# Patient Record
Sex: MALE | Race: WHITE | NOT HISPANIC OR LATINO | Employment: OTHER | URBAN - METROPOLITAN AREA
[De-identification: names, ages, dates, MRNs, and addresses within clinical notes are randomized per-mention and may not be internally consistent; named-entity substitution may affect disease eponyms.]

---

## 2017-09-13 ENCOUNTER — HOSPITAL ENCOUNTER (OUTPATIENT)
Dept: RADIOLOGY | Facility: MEDICAL CENTER | Age: 82
End: 2017-09-13
Attending: INTERNAL MEDICINE
Payer: MEDICARE

## 2017-09-13 ENCOUNTER — OFFICE VISIT (OUTPATIENT)
Dept: MEDICAL GROUP | Facility: PHYSICIAN GROUP | Age: 82
End: 2017-09-13
Payer: MEDICARE

## 2017-09-13 VITALS
SYSTOLIC BLOOD PRESSURE: 118 MMHG | OXYGEN SATURATION: 94 % | DIASTOLIC BLOOD PRESSURE: 70 MMHG | RESPIRATION RATE: 14 BRPM | HEIGHT: 68 IN | HEART RATE: 63 BPM | BODY MASS INDEX: 25.61 KG/M2 | TEMPERATURE: 97.5 F | WEIGHT: 169 LBS

## 2017-09-13 DIAGNOSIS — N40.0 BENIGN NON-NODULAR PROSTATIC HYPERPLASIA WITHOUT LOWER URINARY TRACT SYMPTOMS: ICD-10-CM

## 2017-09-13 DIAGNOSIS — R05.9 COUGH: ICD-10-CM

## 2017-09-13 DIAGNOSIS — G20.A1 PARKINSON DISEASE: ICD-10-CM

## 2017-09-13 DIAGNOSIS — E78.5 DYSLIPIDEMIA: ICD-10-CM

## 2017-09-13 PROCEDURE — 71020 DX-CHEST-2 VIEWS: CPT

## 2017-09-13 PROCEDURE — 99204 OFFICE O/P NEW MOD 45 MIN: CPT | Performed by: INTERNAL MEDICINE

## 2017-09-13 RX ORDER — PRAMIPEXOLE DIHYDROCHLORIDE 0.75 MG/1
TABLET ORAL
COMMUNITY
End: 2017-09-13 | Stop reason: SDUPTHER

## 2017-09-13 RX ORDER — AZITHROMYCIN 250 MG/1
TABLET, FILM COATED ORAL
Qty: 6 TAB | Refills: 0 | Status: SHIPPED | OUTPATIENT
Start: 2017-09-13 | End: 2017-11-13

## 2017-09-13 RX ORDER — DONEPEZIL HYDROCHLORIDE 10 MG/1
10 TABLET, FILM COATED ORAL DAILY
Qty: 30 TAB | Refills: 1 | Status: SHIPPED | OUTPATIENT
Start: 2017-09-13 | End: 2017-11-13 | Stop reason: SDUPTHER

## 2017-09-13 RX ORDER — PRAMIPEXOLE DIHYDROCHLORIDE 0.75 MG/1
1 TABLET ORAL 3 TIMES DAILY
Qty: 90 TAB | Refills: 1 | Status: SHIPPED | OUTPATIENT
Start: 2017-09-13 | End: 2017-11-13 | Stop reason: SDUPTHER

## 2017-09-13 RX ORDER — M-VIT,TX,IRON,MINS/CALC/FOLIC 27MG-0.4MG
1 TABLET ORAL EVERY MORNING
COMMUNITY

## 2017-09-13 RX ORDER — GEMFIBROZIL 600 MG/1
600 TABLET, FILM COATED ORAL 2 TIMES DAILY
COMMUNITY
End: 2017-09-13 | Stop reason: SDUPTHER

## 2017-09-13 RX ORDER — GEMFIBROZIL 600 MG/1
600 TABLET, FILM COATED ORAL 2 TIMES DAILY
Qty: 60 TAB | Refills: 1 | Status: SHIPPED | OUTPATIENT
Start: 2017-09-13 | End: 2017-11-13 | Stop reason: SDUPTHER

## 2017-09-13 RX ORDER — FINASTERIDE 5 MG/1
5 TABLET, FILM COATED ORAL DAILY
COMMUNITY
End: 2017-09-13 | Stop reason: SDUPTHER

## 2017-09-13 RX ORDER — FINASTERIDE 5 MG/1
5 TABLET, FILM COATED ORAL
Qty: 45 TAB | Refills: 1 | Status: SHIPPED | OUTPATIENT
Start: 2017-09-13 | End: 2017-11-13 | Stop reason: SDUPTHER

## 2017-09-13 RX ORDER — DONEPEZIL HYDROCHLORIDE 10 MG/1
10 TABLET, FILM COATED ORAL NIGHTLY
COMMUNITY
End: 2017-09-13 | Stop reason: SDUPTHER

## 2017-09-13 ASSESSMENT — PATIENT HEALTH QUESTIONNAIRE - PHQ9: CLINICAL INTERPRETATION OF PHQ2 SCORE: 0

## 2017-09-13 NOTE — PROGRESS NOTES
Chief Complaint   Patient presents with   • Establish Care         HISTORY OF THE PRESENT ILLNESS: Patient is a 81 y.o. male. This pleasant patient is here today for cough, Parkinson's disease, DLD, BPH      Cough  Pt has had a cough productive of white sputum for the past 3 weeks. He reports associated nasal congestion. He denies symptoms of fevers/chills, sore throat, nasal discharge. He reports his 4 year old grandchild was recently diagnosed with a respiratory infection. He has had similar symptoms in the past and was found to have pneumonia.     Parkinson disease (CMS-HCC)  Pt has known Parkinson's disease that was diagnosed about 2 years ago. He is on Sinemet  QID, pramipexole 0.75 mg TID and now on donepezil for memory problems associated with it. He has problems with hearing and speech that he and his wife feel are worsening.     He was seeing a neurologist in [place] but needs a new referral here.     Dyslipidemia  Pt is on ASA and gemfibrozil for dyslipidemia. He has been on this medication chronically and does not believe it has been rechecked recently. He does not think he has been on a statin medication before. He has no history of MI. He is unsure if he has had a stroke in the past or not.     Benign non-nodular prostatic hyperplasia without lower urinary tract symptoms  Pt is on finasteride 5 mg daily.  He follows with urology for this. He reports somewhat poor control of symptoms on this regimen and he has about at least 2-4 episodes of nocturia at night time. Denies fevers/chills, painful urination        Allergies: Review of patient's allergies indicates not on file.    Current Outpatient Prescriptions Ordered in [facility]   Medication Sig Dispense Refill   • therapeutic multivitamin-minerals (THERAGRAN-M) Tab Take 1 Tab by mouth every day.     • aspirin 81 MG tablet Take 81 mg by mouth every day.     • finasteride (PROSCAR) 5 MG Tab Take 1 Tab by mouth every 48 hours. 45 Tab 1   •  "carbidopa-levodopa (SINEMET)  MG Tab Take 1 Tab by mouth 4 times a day. 120 Tab 1   • gemfibrozil (LOPID) 600 MG Tab Take 1 Tab by mouth 2 times a day. 60 Tab 1   • donepezil (ARICEPT) 10 MG tablet Take 1 Tab by mouth every day. 30 Tab 1   • Pramipexole Dihydrochloride 0.75 MG Tab Take 1 Tab by mouth 3 times a day. 90 Tab 1   • azithromycin (ZITHROMAX) 250 MG Tab 2 tabs po for 1 day, then 1 tab po for next 4 days 6 Tab 0     No current Epic-ordered facility-administered medications on file.        Past Medical History:   Diagnosis Date   • BPH (benign prostatic hyperplasia)    • Hyperlipidemia    • Parkinson disease (CMS-HCC)        No past surgical history on file.    Social History   Substance Use Topics   • Smoking status: Former Smoker     Quit date: 1/1/1990   • Smokeless tobacco: Former User     Quit date: 1/1/1993   • Alcohol use Yes       History reviewed. No pertinent family history.    Review of Systems :    Denies fevers, dysuria      Exam: Blood pressure 118/70, pulse 63, temperature 36.4 °C (97.5 °F), resp. rate 14, height 1.727 m (5' 8\"), weight 76.7 kg (169 lb), SpO2 94 %.    Blood pressure 118/70, pulse 63, temperature 36.4 °C (97.5 °F), resp. rate 14, height 1.727 m (5' 8\"), weight 76.7 kg (169 lb), SpO2 94 %. Body mass index is 25.7 kg/m².   Physical Exam:  Constitutional: Alert & oriented, no acute distress  Eye: Equal, round and reactive, conjunctiva clear, lids normal  ENMT: Lips without lesions, good dentition, oropharynx clear  Neck: Trachea midline, no masses, no thyromegaly. No cervical or supraclavicular lymphadenopathy  Respiratory: Unlabored respiratory effort, lungs clear to auscultation, no wheezes, no ronchi  Cardiovascular: Normal S1, S2, no murmur, no edema  Neuro: Slight resting tremor of left upper extremity, rigidity with passive movement of upper extremities  Psych: Normal mood and affect, judgement normal      Assessment/Plan  1. Parkinson disease (CMS-HCC)  Will refer to " neurology. Will refill medications until pt is seen by neurology. Records request sent to previous neurologists and to PCP  - REFERRAL TO NEUROLOGY  - carbidopa-levodopa (SINEMET)  MG Tab; Take 1 Tab by mouth 4 times a day.  Dispense: 120 Tab; Refill: 1  - CBC WITH DIFFERENTIAL; Future  - COMP METABOLIC PANEL; Future  - TSH WITH REFLEX TO FT4; Future  - donepezil (ARICEPT) 10 MG tablet; Take 1 Tab by mouth every day.  Dispense: 30 Tab; Refill: 1  - Pramipexole Dihydrochloride 0.75 MG Tab; Take 1 Tab by mouth 3 times a day.  Dispense: 90 Tab; Refill: 1    2. Cough  Etiology likely atypical pneumonia given normal exam findings. Will prescribe azithromycin.  However, given pt is high risk for infection will get CXR. If pneumonia seen will escalate antibiotic coverage  - DX-CHEST-2 VIEWS; Future  - azithromycin (ZITHROMAX) 250 MG Tab; 2 tabs po for 1 day, then 1 tab po for next 4 days  Dispense: 6 Tab; Refill: 0    3. Dyslipidemia  Continue gemfibrozil and get labs  - gemfibrozil (LOPID) 600 MG Tab; Take 1 Tab by mouth 2 times a day.  Dispense: 60 Tab; Refill: 1  - LIPID PROFILE; Future    4. Benign non-nodular prostatic hyperplasia without lower urinary tract symptoms  Continue finasteride. Pt counseled to not drink fluids 2-3 hours before bedtime to try and improve symptoms.   - finasteride (PROSCAR) 5 MG Tab; Take 1 Tab by mouth every 48 hours.  Dispense: 45 Tab; Refill: 1      Return in about 2 months (around 11/13/2017).    Please note that this dictation was created using voice recognition software. I have made every reasonable attempt to correct obvious errors, but I expect that there are errors of grammar and possibly content that I did not discover before finalizing the note.

## 2017-09-13 NOTE — ASSESSMENT & PLAN NOTE
Pt is on finasteride 5 mg daily.  He follows with urology for this. He reports somewhat poor control of symptoms on this regimen and he has about at least 2-4 episodes of nocturia at night time. Denies fevers/chills, painful urination

## 2017-09-13 NOTE — ASSESSMENT & PLAN NOTE
Pt has known Parkinson's disease that was diagnosed about 2 years ago. He is on Sinemet  QID, pramipexole 0.75 mg TID and now on donepezil for memory problems associated with it. He has problems with hearing and speech that he and his wife feel are worsening.     He was seeing a neurologist in [place] but needs a new referral here.

## 2017-09-13 NOTE — LETTER
[facility]  Kirk Langford M.D.  [address]  [place] [address]  Fax: [phone]   Authorization for Release/Disclosure of   Protected Health Information   Name: MIHCAEL MORROW : 1935 SSN: [id number]   Address: [facility] [place] [place] [address] Phone:    [phone] (home)    I authorize the entity listed below to release/disclose the PHI below to:   [facility]/Kirk Langford M.D. and Kirk Langford M.D.   Provider or Entity Name:  [facility]: Reji CHOW MD      Address   City, State, Zip   Phone:      Fax:     Reason for request: continuity of care   Information to be released:    [  ] LAST COLONOSCOPY,  including any PATH REPORT and follow-up  [  ] LAST FIT/COLOGUARD RESULT [  ] LAST DEXA  [  ] LAST MAMMOGRAM  [  ] LAST PAP  [  ] LAST LABS [  ] RETINA EXAM REPORT  [  ] IMMUNIZATION RECORDS  [X] Release all info      [  ] Check here and initial the line next to each item to release ALL health information INCLUDING  _____ Care and treatment for drug and / or alcohol abuse  _____ HIV testing, infection status, or AIDS  _____ Genetic Testing    DATES OF SERVICE OR TIME PERIOD TO BE DISCLOSED: _____________  I understand and acknowledge that:  * This Authorization may be revoked at any time by you in writing, except if your health information has already been used or disclosed.  * Your health information that will be used or disclosed as a result of you signing this authorization could be re-disclosed by the recipient. If this occurs, your re-disclosed health information may no longer be protected by State or Federal laws.  * You may refuse to sign this Authorization. Your refusal will not affect your ability to obtain treatment.  * This Authorization becomes effective upon signing and will  on (date) __________.      If no date is indicated, this Authorization will  one (1) year from the signature date.    Name: Michael Morrow    Signature:   Date:          9/13/2017       PLEASE FAX REQUESTED RECORDS BACK TO: [phone]

## 2017-09-13 NOTE — LETTER
[facility]  Kirk Langford M.D.  [address]  [place] [address]  Fax: [phone]   Authorization for Release/Disclosure of   Protected Health Information   Name: MICHAEL CARRASCO : 1935 SSN: [id number]   Address[facility] [address] [place] [address] Phone:    [phone] (home)    I authorize the entity listed below to release/disclose the PHI below to:   [facility]/Kirk Langford M.D. and Kirk Langford M.D.   Provider or Entity Name:  Chas Kirby MD, Neurology, [facility]      Address   City, State, Zip   Phone:      Fax:     Reason for request: continuity of care   Information to be released:    [  ] LAST COLONOSCOPY,  including any PATH REPORT and follow-up  [  ] LAST FIT/COLOGUARD RESULT [  ] LAST DEXA  [  ] LAST MAMMOGRAM  [  ] LAST PAP  [  ] LAST LABS [  ] RETINA EXAM REPORT  [  ] IMMUNIZATION RECORDS  [X] Release all info      [  ] Check here and initial the line next to each item to release ALL health information INCLUDING  _____ Care and treatment for drug and / or alcohol abuse  _____ HIV testing, infection status, or AIDS  _____ Genetic Testing    DATES OF SERVICE OR TIME PERIOD TO BE DISCLOSED: _____________  I understand and acknowledge that:  * This Authorization may be revoked at any time by you in writing, except if your health information has already been used or disclosed.  * Your health information that will be used or disclosed as a result of you signing this authorization could be re-disclosed by the recipient. If this occurs, your re-disclosed health information may no longer be protected by State or Federal laws.  * You may refuse to sign this Authorization. Your refusal will not affect your ability to obtain treatment.  * This Authorization becomes effective upon signing and will  on (date) __________.      If no date is indicated, this Authorization will  one (1) year from the signature date.    Name: Michael  Odalys    Signature:   Date:     9/13/2017       PLEASE FAX REQUESTED RECORDS BACK TO: [phone]

## 2017-09-13 NOTE — ASSESSMENT & PLAN NOTE
Pt has had a cough productive of white sputum for the past 3 weeks. He reports associated nasal congestion. He denies symptoms of fevers/chills, sore throat, nasal discharge. He reports his 4 year old grandchild was recently diagnosed with a respiratory infection. He has had similar symptoms in the past and was found to have pneumonia.

## 2017-09-13 NOTE — ASSESSMENT & PLAN NOTE
Pt is on ASA and gemfibrozil for dyslipidemia. He has been on this medication chronically and does not believe it has been rechecked recently. He does not think he has been on a statin medication before. He has no history of MI. He is unsure if he has had a stroke in the past or not.

## 2017-09-13 NOTE — LETTER
[facility]  Kirk Langford M.D.  [address]  [place] [address]  Fax: [phone]   Authorization for Release/Disclosure of   Protected Health Information   Name: MICHAEL CARRASCO : 1935 SSN: [id number]   Address: [address] [place] [address] Phone:    [phone] (home)    I authorize the entity listed below to release/disclose the PHI below to:   [facility]/Kirk Langford M.D. and Kirk Langford M.D.   Provider or Entity Name:  [facility]   Address   City, State, Zip  Address: [address] [place] [address] [place] [address]  Phone: [phone] Phone:      Fax:     Reason for request: continuity of care   Information to be released:    [  ] LAST COLONOSCOPY,  including any PATH REPORT and follow-up  [  ] LAST FIT/COLOGUARD RESULT [  ] LAST DEXA  [  ] LAST MAMMOGRAM  [  ] LAST PAP  [  ] LAST LABS [  ] RETINA EXAM REPORT  [  ] IMMUNIZATION RECORDS  [X] Release all info      [  ] Check here and initial the line next to each item to release ALL health information INCLUDING  _____ Care and treatment for drug and / or alcohol abuse  _____ HIV testing, infection status, or AIDS  _____ Genetic Testing    DATES OF SERVICE OR TIME PERIOD TO BE DISCLOSED: _____________  I understand and acknowledge that:  * This Authorization may be revoked at any time by you in writing, except if your health information has already been used or disclosed.  * Your health information that will be used or disclosed as a result of you signing this authorization could be re-disclosed by the recipient. If this occurs, your re-disclosed health information may no longer be protected by State or Federal laws.  * You may refuse to sign this Authorization. Your refusal will not affect your ability to obtain treatment.  * This Authorization becomes effective upon signing and will  on (date) __________.      If no date is indicated, this Authorization will  one (1) year from the signature date.    Name:  Shukri Morrow    Signature:   Date:     9/13/2017       PLEASE FAX REQUESTED RECORDS BACK TO: [phone]

## 2017-09-26 ENCOUNTER — HOSPITAL ENCOUNTER (OUTPATIENT)
Dept: LAB | Facility: MEDICAL CENTER | Age: 82
End: 2017-09-26
Attending: INTERNAL MEDICINE
Payer: MEDICARE

## 2017-09-26 DIAGNOSIS — G20.A1 PARKINSON DISEASE: ICD-10-CM

## 2017-09-26 DIAGNOSIS — E78.5 DYSLIPIDEMIA: ICD-10-CM

## 2017-09-26 LAB
ALBUMIN SERPL BCP-MCNC: 4.6 G/DL (ref 3.2–4.9)
ALBUMIN/GLOB SERPL: 1.9 G/DL
ALP SERPL-CCNC: 106 U/L (ref 30–99)
ALT SERPL-CCNC: 12 U/L (ref 2–50)
ANION GAP SERPL CALC-SCNC: 8 MMOL/L (ref 0–11.9)
AST SERPL-CCNC: 29 U/L (ref 12–45)
BASOPHILS # BLD AUTO: 0.7 % (ref 0–1.8)
BASOPHILS # BLD: 0.03 K/UL (ref 0–0.12)
BILIRUB SERPL-MCNC: 0.8 MG/DL (ref 0.1–1.5)
BUN SERPL-MCNC: 25 MG/DL (ref 8–22)
CALCIUM SERPL-MCNC: 9.9 MG/DL (ref 8.5–10.5)
CHLORIDE SERPL-SCNC: 105 MMOL/L (ref 96–112)
CHOLEST SERPL-MCNC: 153 MG/DL (ref 100–199)
CO2 SERPL-SCNC: 25 MMOL/L (ref 20–33)
CREAT SERPL-MCNC: 1.03 MG/DL (ref 0.5–1.4)
EOSINOPHIL # BLD AUTO: 0.17 K/UL (ref 0–0.51)
EOSINOPHIL NFR BLD: 4 % (ref 0–6.9)
ERYTHROCYTE [DISTWIDTH] IN BLOOD BY AUTOMATED COUNT: 41.6 FL (ref 35.9–50)
GFR SERPL CREATININE-BSD FRML MDRD: >60 ML/MIN/1.73 M 2
GLOBULIN SER CALC-MCNC: 2.4 G/DL (ref 1.9–3.5)
GLUCOSE SERPL-MCNC: 82 MG/DL (ref 65–99)
HCT VFR BLD AUTO: 45.4 % (ref 42–52)
HDLC SERPL-MCNC: 39 MG/DL
HGB BLD-MCNC: 16 G/DL (ref 14–18)
IMM GRANULOCYTES # BLD AUTO: 0.06 K/UL (ref 0–0.11)
IMM GRANULOCYTES NFR BLD AUTO: 1.4 % (ref 0–0.9)
LDLC SERPL CALC-MCNC: 92 MG/DL
LYMPHOCYTES # BLD AUTO: 1.02 K/UL (ref 1–4.8)
LYMPHOCYTES NFR BLD: 23.8 % (ref 22–41)
MCH RBC QN AUTO: 31.6 PG (ref 27–33)
MCHC RBC AUTO-ENTMCNC: 35.2 G/DL (ref 33.7–35.3)
MCV RBC AUTO: 89.5 FL (ref 81.4–97.8)
MONOCYTES # BLD AUTO: 0.4 K/UL (ref 0–0.85)
MONOCYTES NFR BLD AUTO: 9.3 % (ref 0–13.4)
NEUTROPHILS # BLD AUTO: 2.6 K/UL (ref 1.82–7.42)
NEUTROPHILS NFR BLD: 60.8 % (ref 44–72)
NRBC # BLD AUTO: 0 K/UL
NRBC BLD AUTO-RTO: 0 /100 WBC
PLATELET # BLD AUTO: 189 K/UL (ref 164–446)
PMV BLD AUTO: 12.1 FL (ref 9–12.9)
POTASSIUM SERPL-SCNC: 3.9 MMOL/L (ref 3.6–5.5)
PROT SERPL-MCNC: 7 G/DL (ref 6–8.2)
RBC # BLD AUTO: 5.07 M/UL (ref 4.7–6.1)
SODIUM SERPL-SCNC: 138 MMOL/L (ref 135–145)
TRIGL SERPL-MCNC: 111 MG/DL (ref 0–149)
TSH SERPL DL<=0.005 MIU/L-ACNC: 2.43 UIU/ML (ref 0.3–3.7)
WBC # BLD AUTO: 4.3 K/UL (ref 4.8–10.8)

## 2017-09-26 PROCEDURE — 80061 LIPID PANEL: CPT

## 2017-09-26 PROCEDURE — 36415 COLL VENOUS BLD VENIPUNCTURE: CPT

## 2017-09-26 PROCEDURE — 84443 ASSAY THYROID STIM HORMONE: CPT

## 2017-09-26 PROCEDURE — 85025 COMPLETE CBC W/AUTO DIFF WBC: CPT

## 2017-09-26 PROCEDURE — 80053 COMPREHEN METABOLIC PANEL: CPT

## 2017-10-04 ENCOUNTER — NON-PROVIDER VISIT (OUTPATIENT)
Dept: URGENT CARE | Facility: PHYSICIAN GROUP | Age: 82
End: 2017-10-04
Payer: MEDICARE

## 2017-10-04 DIAGNOSIS — Z23 NEED FOR INFLUENZA VACCINATION: ICD-10-CM

## 2017-10-04 PROCEDURE — G0008 ADMIN INFLUENZA VIRUS VAC: HCPCS | Performed by: PHYSICIAN ASSISTANT

## 2017-10-04 PROCEDURE — 90662 IIV NO PRSV INCREASED AG IM: CPT | Performed by: PHYSICIAN ASSISTANT

## 2017-10-04 NOTE — NON-PROVIDER
Shukri Morrow is a 81 y.o. male here for a non-provider visit for:   FLU    Reason for immunization: Annual Flu Vaccine  Immunization records indicate need for vaccine: Yes, confirmed with Epic  Minimum interval has been met for this vaccine: Yes  ABN completed: Not Indicated    Order and dose verified by: HERBER  VIS Dated  8/07/15 was given to patient: Yes  IAC Questionnaire abnormal. Questionnaire reviewed and administration of injection approved by provider: Nelson    Patient tolerated injection and no adverse effects were observed or reported: Yes    Pt scheduled for next dose in series: Not Indicated

## 2017-10-09 ENCOUNTER — TELEPHONE (OUTPATIENT)
Dept: MEDICAL GROUP | Facility: PHYSICIAN GROUP | Age: 82
End: 2017-10-09

## 2017-10-09 NOTE — TELEPHONE ENCOUNTER
Pts wife called asking for lab results, I told her that his labs will be discussed on the following appointment/ she also asked for the referral for his parkinson disease, I let her know that neurology should be contacting them to set up a schedule.

## 2017-11-13 ENCOUNTER — OFFICE VISIT (OUTPATIENT)
Dept: MEDICAL GROUP | Facility: PHYSICIAN GROUP | Age: 82
End: 2017-11-13
Payer: MEDICARE

## 2017-11-13 VITALS
HEIGHT: 68 IN | WEIGHT: 174 LBS | RESPIRATION RATE: 16 BRPM | OXYGEN SATURATION: 96 % | SYSTOLIC BLOOD PRESSURE: 100 MMHG | HEART RATE: 60 BPM | DIASTOLIC BLOOD PRESSURE: 80 MMHG | TEMPERATURE: 97 F | BODY MASS INDEX: 26.37 KG/M2

## 2017-11-13 DIAGNOSIS — R74.8 ELEVATED ALKALINE PHOSPHATASE LEVEL: ICD-10-CM

## 2017-11-13 DIAGNOSIS — G20.A1 PARKINSON DISEASE: ICD-10-CM

## 2017-11-13 DIAGNOSIS — N40.0 BENIGN NON-NODULAR PROSTATIC HYPERPLASIA WITHOUT LOWER URINARY TRACT SYMPTOMS: ICD-10-CM

## 2017-11-13 DIAGNOSIS — E78.5 DYSLIPIDEMIA: ICD-10-CM

## 2017-11-13 DIAGNOSIS — D72.829 LEUKOCYTOSIS, UNSPECIFIED TYPE: ICD-10-CM

## 2017-11-13 PROCEDURE — 99214 OFFICE O/P EST MOD 30 MIN: CPT | Performed by: INTERNAL MEDICINE

## 2017-11-13 RX ORDER — DONEPEZIL HYDROCHLORIDE 10 MG/1
10 TABLET, FILM COATED ORAL DAILY
Qty: 30 TAB | Refills: 2 | Status: SHIPPED | OUTPATIENT
Start: 2017-11-13 | End: 2018-01-10 | Stop reason: SDUPTHER

## 2017-11-13 RX ORDER — PRAMIPEXOLE DIHYDROCHLORIDE 0.75 MG/1
1 TABLET ORAL 3 TIMES DAILY
Qty: 90 TAB | Refills: 2 | Status: SHIPPED | OUTPATIENT
Start: 2017-11-13 | End: 2018-01-10 | Stop reason: SDUPTHER

## 2017-11-13 RX ORDER — GEMFIBROZIL 600 MG/1
600 TABLET, FILM COATED ORAL 2 TIMES DAILY
Qty: 60 TAB | Refills: 2 | Status: SHIPPED | OUTPATIENT
Start: 2017-11-13 | End: 2018-02-26 | Stop reason: SDUPTHER

## 2017-11-13 RX ORDER — FINASTERIDE 5 MG/1
5 TABLET, FILM COATED ORAL DAILY
Qty: 90 TAB | Refills: 1 | Status: SHIPPED | OUTPATIENT
Start: 2017-11-13

## 2017-11-13 NOTE — ASSESSMENT & PLAN NOTE
PT is on pramipexole, sinemet, and donepezil. He feels that the symptoms are stable. He goes to a boxing class that is for Parkinson's Disease specifically. He was referred to neurology but hasn't set up appointment with new provider yet. He was seeing a neurologist in [place].     He reports that he hears things at night. His wife feels that he is seeing things. Upon further questioning he states he'll wake up and will have thought his wife said there was someone outside. She then told him she didn't say anything. He does not feel it happens during the day but his wife is concerned that it happens in the day as well. It is mostly auditory hallucinations.

## 2017-11-13 NOTE — PROGRESS NOTES
Subjective:   Shukri Morrow is a 81 y.o. male here today for Parkinson's Disease, DLD, BPH    Parkinson disease (CMS-[facility])  PT is on pramipexole, sinemet, and donepezil. He feels that the symptoms are stable. He goes to a boxing class that is for Parkinson's Disease specifically. He was referred to neurology but hasn't set up appointment with new provider yet. He was seeing a neurologist in [place].     He reports that he hears things at night. His wife feels that he is seeing things. Upon further questioning he states he'll wake up and will have thought his wife said there was someone outside. She then told him she didn't say anything. He does not feel it happens during the day but his wife is concerned that it happens in the day as well. It is mostly auditory hallucinations.     Dyslipidemia  Pt remains on gemfibrozil and ASA for this.     Benign non-nodular prostatic hyperplasia without lower urinary tract symptoms  Pt is on finasteride for this. He takes it every other day. He follows with urology but hasn't seen them recently.        Current medicines (including changes today)  Current Outpatient Prescriptions   Medication Sig Dispense Refill   • finasteride (PROSCAR) 5 MG Tab Take 1 Tab by mouth every day. 90 Tab 1   • Pramipexole Dihydrochloride 0.75 MG Tab Take 1 Tab by mouth 3 times a day. 90 Tab 2   • gemfibrozil (LOPID) 600 MG Tab Take 1 Tab by mouth 2 times a day. 60 Tab 2   • donepezil (ARICEPT) 10 MG tablet Take 1 Tab by mouth every day. 30 Tab 2   • carbidopa-levodopa (SINEMET)  MG Tab Take 1 Tab by mouth 4 times a day. 120 Tab 2   • therapeutic multivitamin-minerals (THERAGRAN-M) Tab Take 1 Tab by mouth every day.     • aspirin 81 MG tablet Take 81 mg by mouth every day.       No current facility-administered medications for this visit.      He  has a past medical history of BPH (benign prostatic hyperplasia); Hyperlipidemia; and Parkinson disease (CMS-HCC).    ROS   No fevers/chills, burning  "dyrusia     Objective:     Blood pressure 100/80, pulse 60, temperature 36.1 °C (97 °F), resp. rate 16, height 1.727 m (5' 8\"), weight 78.9 kg (174 lb), SpO2 96 %. Body mass index is 26.46 kg/m².   Physical Exam:  Constitutional: Alert & oriented, no acute distress  Eye: Conjunctiva clear, lids normal, no discharge  ENMT: Lips without lesions, normal external nose and ears  Neck: Trachea midline, no masses, no thyromegaly. No cervical or supraclavicular lymphadenopathy  Respiratory: Unlabored respiratory effort, lungs clear to auscultation, no wheezes, no ronchi  Cardiovascular: Normal S1, S2, no murmur  Skin: Warm, dry, good turgor, no rashes in visible areas  Neuro: Resting tremor of right arm  Psych: Normal mood and affect      Assessment and Plan:   The following treatment plan was discussed    1. Parkinson disease (CMS-HCC)  Counseled patient and wife that it is very important for him to get established with a neurologist. Continue current medications all pending establishment with neurologist  - Pramipexole Dihydrochloride 0.75 MG Tab; Take 1 Tab by mouth 3 times a day.  Dispense: 90 Tab; Refill: 2  - donepezil (ARICEPT) 10 MG tablet; Take 1 Tab by mouth every day.  Dispense: 30 Tab; Refill: 2  - carbidopa-levodopa (SINEMET)  MG Tab; Take 1 Tab by mouth 4 times a day.  Dispense: 120 Tab; Refill: 2    2. Dyslipidemia  Well-controlled. Continue current medication  - gemfibrozil (LOPID) 600 MG Tab; Take 1 Tab by mouth 2 times a day.  Dispense: 60 Tab; Refill: 2    3. Leukocytosis, unspecified type  Mild. Recheck lab one month  - CBC WITH DIFFERENTIAL; Future    4. Elevated alkaline phosphatase level  Mild. Recheck lab in one month  - COMP METABOLIC PANEL; Future    5. Benign non-nodular prostatic hyperplasia without lower urinary tract symptoms  We'll increase to daily. Patient follows with urology  - finasteride (PROSCAR) 5 MG Tab; Take 1 Tab by mouth every day.  Dispense: 90 Tab; Refill: 1      Followup: " Return in about 3 months (around 2/13/2018).    Please note that this dictation was created using voice recognition software. I have made every reasonable attempt to correct obvious errors, but I expect that there are errors of grammar and possibly content that I did not discover before finalizing the note.

## 2017-11-13 NOTE — ASSESSMENT & PLAN NOTE
Pt is on finasteride for this. He takes it every other day. He follows with urology but hasn't seen them recently.

## 2017-12-26 ENCOUNTER — OFFICE VISIT (OUTPATIENT)
Dept: MEDICAL GROUP | Facility: PHYSICIAN GROUP | Age: 82
End: 2017-12-26
Payer: MEDICARE

## 2017-12-26 VITALS
WEIGHT: 176 LBS | HEIGHT: 68 IN | RESPIRATION RATE: 16 BRPM | BODY MASS INDEX: 26.67 KG/M2 | OXYGEN SATURATION: 99 % | HEART RATE: 68 BPM | SYSTOLIC BLOOD PRESSURE: 114 MMHG | TEMPERATURE: 98 F | DIASTOLIC BLOOD PRESSURE: 72 MMHG

## 2017-12-26 DIAGNOSIS — R74.8 ELEVATED ALKALINE PHOSPHATASE LEVEL: ICD-10-CM

## 2017-12-26 DIAGNOSIS — R05.9 COUGH: ICD-10-CM

## 2017-12-26 DIAGNOSIS — N40.0 BENIGN NON-NODULAR PROSTATIC HYPERPLASIA WITHOUT LOWER URINARY TRACT SYMPTOMS: ICD-10-CM

## 2017-12-26 DIAGNOSIS — G20.A1 PARKINSON DISEASE: ICD-10-CM

## 2017-12-26 PROCEDURE — 99214 OFFICE O/P EST MOD 30 MIN: CPT | Performed by: INTERNAL MEDICINE

## 2017-12-26 NOTE — ASSESSMENT & PLAN NOTE
Has had a cough for the past week with some mucous. Denies fevers, chills, nausea, vomiting, body aches. Has had rhinorrhea. Wife has some concern that he could be aspirating.

## 2017-12-26 NOTE — ASSESSMENT & PLAN NOTE
Moved here to [place] 6 months ago and hasn't established with a neurologist yet, appointment is set for 3/2017. It will have been a year by 3/2017 since he's seen a neurologist. Per wife his symptoms have been stable on his current medications. His speech and swallowing have worsened in the last couple months.

## 2017-12-26 NOTE — PROGRESS NOTES
PRIMARY CARE CLINIC NEW PATIENT H&P  Chief Complaint   Patient presents with   • Cough     History of Present Illness     Parkinson disease (CMS-[facility])  Moved here to [place] 6 months ago and hasn't established with a neurologist yet, appointment is set for 3/2017. It will have been a year by 3/2017 since he's seen a neurologist. Per wife his symptoms have been stable on his current medications. His speech and swallowing have worsened in the last couple months.     Benign non-nodular prostatic hyperplasia without lower urinary tract symptoms  Used to take finasteride every other day and is now taking it daily since last month. Per wife he isn't complaining about his urinary symptoms as much.     Cough  Has had a cough for the past week with some mucous. Denies fevers, chills, nausea, vomiting, body aches. Has had rhinorrhea. Wife has some concern that he could be aspirating.     Current Outpatient Prescriptions   Medication Sig Dispense Refill   • finasteride (PROSCAR) 5 MG Tab Take 1 Tab by mouth every day. 90 Tab 1   • Pramipexole Dihydrochloride 0.75 MG Tab Take 1 Tab by mouth 3 times a day. 90 Tab 2   • gemfibrozil (LOPID) 600 MG Tab Take 1 Tab by mouth 2 times a day. 60 Tab 2   • donepezil (ARICEPT) 10 MG tablet Take 1 Tab by mouth every day. 30 Tab 2   • carbidopa-levodopa (SINEMET)  MG Tab Take 1 Tab by mouth 4 times a day. 120 Tab 2   • therapeutic multivitamin-minerals (THERAGRAN-M) Tab Take 1 Tab by mouth every day.     • aspirin 81 MG tablet Take 81 mg by mouth every day.     • LYCOPENE PO Take  by mouth.       No current facility-administered medications for this visit.      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 No pertinent surgical  "history.  Social History   Substance Use Topics   • Smoking status: Former Smoker     Packs/day: 1.00     Years: 30.00     Quit date: 1990   • Smokeless tobacco: Former User     Quit date: 1993   • Alcohol use Yes      Comment: only on special occassions      Social History     Social History Narrative    Retired from [employer] (30 years), high school (13 years)     Family History   Problem Relation Age of Onset   • Diabetes Mother      Family Status   Relation Status   • Mother    • Father      Allergies: Patient has no known allergies.    ROS  Constitutional: Negative for fatigue/generalized weakness.   HEENT: Negative for  vision changes, hearing changes    Respiratory: Negative for shortness of breath  Cardiovascular: Negative for chest pain, palpitations  Gastrointestinal: Negative for blood in stool, constipation, diarrhea  Genitourinary: Negative for dysuria  Musculoskeletal: postive for chronic joint stiffness  Skin: Negative for rash  Neurological: Negative for numbness, tingling  Psychiatric/Behavioral: Negative for depression, anxiety      Objective   Blood pressure 114/72, pulse 68, temperature 36.7 °C (98 °F), resp. rate 16, height 1.727 m (5' 8\"), weight 79.8 kg (176 lb), SpO2 99 %. Body mass index is 26.76 kg/m².    General: Alert, oriented. In no acute distress but occasionally coughing   HEET: EOMI, PERRL, conjunctiva non-injected, sclera non-icteric.  Nares patent with no significant congestion or drainage.  Maricel pinnae, external auditory canals, TM pearly gray with normal light reflex bilaterally.Oral mucous membranes pink and moist with no lesions.  Neck: supple with no cervical, subclavicular lymphadenopathy, JVD, palpable thyroid nodules   Lungs: clear to auscultation bilaterally with good excursion.  CV: regular rate and rhythm.  Abdomen soft, non-distended, non-tender with normal bowel sounds. No hepatosplenomegaly, no masses palpated  Skin: no lesions. Warm, dry "   Psychiatric: appropriate mood and affect     Assessment and Plan   The following treatment plan was discussed     1. Parkinson disease (CMS-HCC)  Currently stable and had medications refilled by prior PCP last month. Next neurology follow up isn't until 3/2017 by which time it will have been a year since he's seen a neurologist. Placed an urgent referral to neurology with request specifically for a movement specialist.   - REFERRAL TO NEUROLOGY    2. Benign non-nodular prostatic hyperplasia without lower urinary tract symptoms  Symptoms of BPH stable on finasteride daily.     3. Cough  Lungs are clear, afebrile with low suspicion for pneumonia at this time however concern for aspiration given his  Parkinson's. Will evaluate with swallow study.   - CLINICAL SWALLOW EVALUATION    4. Elevated alkaline phosphatase level  Alk phos was 106 as of 9/2017, will obtain isoenzymes.   - ALKALINE PHOSPHATASE ISOENZYMES      Return in about 3 months (around 3/26/2018).    Health Maintenance      Health Maintenance Due   Topic Date Due   • Annual Wellness Visit  1935   • IMM DTaP/Tdap/Td Vaccine (1 - Tdap) 12/28/1956   • IMM PNEUMOCOCCAL 65+ (ADULT) LOW/MEDIUM RISK SERIES (2 of 2 - PCV13) 12/26/2009       Maurice Keene MD  Internal Medicine  [facility]

## 2017-12-26 NOTE — ASSESSMENT & PLAN NOTE
Used to take finasteride every other day and is now taking it daily since last month. Per wife he isn't complaining about his urinary symptoms as much.

## 2018-01-06 ENCOUNTER — APPOINTMENT (OUTPATIENT)
Dept: RADIOLOGY | Facility: MEDICAL CENTER | Age: 83
DRG: 244 | End: 2018-01-06
Attending: EMERGENCY MEDICINE
Payer: MEDICARE

## 2018-01-06 ENCOUNTER — HOSPITAL ENCOUNTER (INPATIENT)
Facility: MEDICAL CENTER | Age: 83
LOS: 1 days | DRG: 244 | End: 2018-01-08
Attending: EMERGENCY MEDICINE | Admitting: HOSPITALIST
Payer: MEDICARE

## 2018-01-06 ENCOUNTER — RESOLUTE PROFESSIONAL BILLING HOSPITAL PROF FEE (OUTPATIENT)
Dept: HOSPITALIST | Facility: MEDICAL CENTER | Age: 83
End: 2018-01-06
Payer: MEDICARE

## 2018-01-06 DIAGNOSIS — I44.1 MOBITZ TYPE II BLOCK: ICD-10-CM

## 2018-01-06 DIAGNOSIS — R55 SYNCOPE, UNSPECIFIED SYNCOPE TYPE: ICD-10-CM

## 2018-01-06 DIAGNOSIS — T14.8XXA ABRASION: ICD-10-CM

## 2018-01-06 LAB
ALBUMIN SERPL BCP-MCNC: 4.5 G/DL (ref 3.2–4.9)
ALBUMIN/GLOB SERPL: 1.8 G/DL
ALP SERPL-CCNC: 95 U/L (ref 30–99)
ALT SERPL-CCNC: 6 U/L (ref 2–50)
ANION GAP SERPL CALC-SCNC: 7 MMOL/L (ref 0–11.9)
APTT PPP: 28.9 SEC (ref 24.7–36)
AST SERPL-CCNC: 23 U/L (ref 12–45)
BASOPHILS # BLD AUTO: 0.6 % (ref 0–1.8)
BASOPHILS # BLD: 0.03 K/UL (ref 0–0.12)
BILIRUB SERPL-MCNC: 0.4 MG/DL (ref 0.1–1.5)
BNP SERPL-MCNC: 124 PG/ML (ref 0–100)
BUN SERPL-MCNC: 29 MG/DL (ref 8–22)
CALCIUM SERPL-MCNC: 9.7 MG/DL (ref 8.5–10.5)
CHLORIDE SERPL-SCNC: 107 MMOL/L (ref 96–112)
CO2 SERPL-SCNC: 25 MMOL/L (ref 20–33)
CREAT SERPL-MCNC: 1.25 MG/DL (ref 0.5–1.4)
EOSINOPHIL # BLD AUTO: 0.16 K/UL (ref 0–0.51)
EOSINOPHIL NFR BLD: 3.4 % (ref 0–6.9)
ERYTHROCYTE [DISTWIDTH] IN BLOOD BY AUTOMATED COUNT: 42.1 FL (ref 35.9–50)
GFR SERPL CREATININE-BSD FRML MDRD: 56 ML/MIN/1.73 M 2
GLOBULIN SER CALC-MCNC: 2.5 G/DL (ref 1.9–3.5)
GLUCOSE SERPL-MCNC: 120 MG/DL (ref 65–99)
HCT VFR BLD AUTO: 41.6 % (ref 42–52)
HGB BLD-MCNC: 14.9 G/DL (ref 14–18)
IMM GRANULOCYTES # BLD AUTO: 0.05 K/UL (ref 0–0.11)
IMM GRANULOCYTES NFR BLD AUTO: 1.1 % (ref 0–0.9)
INR PPP: 1.09 (ref 0.87–1.13)
LYMPHOCYTES # BLD AUTO: 1.19 K/UL (ref 1–4.8)
LYMPHOCYTES NFR BLD: 25.5 % (ref 22–41)
MCH RBC QN AUTO: 32 PG (ref 27–33)
MCHC RBC AUTO-ENTMCNC: 35.8 G/DL (ref 33.7–35.3)
MCV RBC AUTO: 89.3 FL (ref 81.4–97.8)
MONOCYTES # BLD AUTO: 0.46 K/UL (ref 0–0.85)
MONOCYTES NFR BLD AUTO: 9.9 % (ref 0–13.4)
NEUTROPHILS # BLD AUTO: 2.78 K/UL (ref 1.82–7.42)
NEUTROPHILS NFR BLD: 59.5 % (ref 44–72)
NRBC # BLD AUTO: 0 K/UL
NRBC BLD-RTO: 0 /100 WBC
PLATELET # BLD AUTO: 219 K/UL (ref 164–446)
PMV BLD AUTO: 11 FL (ref 9–12.9)
POTASSIUM SERPL-SCNC: 4 MMOL/L (ref 3.6–5.5)
PROT SERPL-MCNC: 7 G/DL (ref 6–8.2)
PROTHROMBIN TIME: 13.8 SEC (ref 12–14.6)
RBC # BLD AUTO: 4.66 M/UL (ref 4.7–6.1)
SODIUM SERPL-SCNC: 139 MMOL/L (ref 135–145)
TROPONIN I SERPL-MCNC: <0.01 NG/ML (ref 0–0.04)
WBC # BLD AUTO: 4.7 K/UL (ref 4.8–10.8)

## 2018-01-06 PROCEDURE — 73030 X-RAY EXAM OF SHOULDER: CPT | Mod: RT

## 2018-01-06 PROCEDURE — 700105 HCHG RX REV CODE 258: Performed by: EMERGENCY MEDICINE

## 2018-01-06 PROCEDURE — 94760 N-INVAS EAR/PLS OXIMETRY 1: CPT

## 2018-01-06 PROCEDURE — 85610 PROTHROMBIN TIME: CPT

## 2018-01-06 PROCEDURE — 93005 ELECTROCARDIOGRAM TRACING: CPT | Performed by: EMERGENCY MEDICINE

## 2018-01-06 PROCEDURE — 72125 CT NECK SPINE W/O DYE: CPT

## 2018-01-06 PROCEDURE — 304999 HCHG REPAIR-SIMPLE/INTERMED LEVEL 1

## 2018-01-06 PROCEDURE — 84484 ASSAY OF TROPONIN QUANT: CPT

## 2018-01-06 PROCEDURE — 85025 COMPLETE CBC W/AUTO DIFF WBC: CPT

## 2018-01-06 PROCEDURE — 83880 ASSAY OF NATRIURETIC PEPTIDE: CPT

## 2018-01-06 PROCEDURE — 70450 CT HEAD/BRAIN W/O DYE: CPT

## 2018-01-06 PROCEDURE — 90715 TDAP VACCINE 7 YRS/> IM: CPT | Performed by: EMERGENCY MEDICINE

## 2018-01-06 PROCEDURE — 36415 COLL VENOUS BLD VENIPUNCTURE: CPT

## 2018-01-06 PROCEDURE — 71045 X-RAY EXAM CHEST 1 VIEW: CPT

## 2018-01-06 PROCEDURE — 0HQ1XZZ REPAIR FACE SKIN, EXTERNAL APPROACH: ICD-10-PCS | Performed by: EMERGENCY MEDICINE

## 2018-01-06 PROCEDURE — G0378 HOSPITAL OBSERVATION PER HR: HCPCS

## 2018-01-06 PROCEDURE — 80053 COMPREHEN METABOLIC PANEL: CPT

## 2018-01-06 PROCEDURE — 99285 EMERGENCY DEPT VISIT HI MDM: CPT

## 2018-01-06 PROCEDURE — 700105 HCHG RX REV CODE 258: Performed by: HOSPITALIST

## 2018-01-06 PROCEDURE — 3E0234Z INTRODUCTION OF SERUM, TOXOID AND VACCINE INTO MUSCLE, PERCUTANEOUS APPROACH: ICD-10-PCS | Performed by: EMERGENCY MEDICINE

## 2018-01-06 PROCEDURE — 700102 HCHG RX REV CODE 250 W/ 637 OVERRIDE(OP): Performed by: HOSPITALIST

## 2018-01-06 PROCEDURE — A9270 NON-COVERED ITEM OR SERVICE: HCPCS | Performed by: HOSPITALIST

## 2018-01-06 PROCEDURE — 304217 HCHG IRRIGATION SYSTEM

## 2018-01-06 PROCEDURE — 700101 HCHG RX REV CODE 250: Performed by: EMERGENCY MEDICINE

## 2018-01-06 PROCEDURE — 303747 HCHG EXTRA SUTURE

## 2018-01-06 PROCEDURE — 90471 IMMUNIZATION ADMIN: CPT

## 2018-01-06 PROCEDURE — 99220 PR INITIAL OBSERVATION CARE,LEVL III: CPT | Performed by: HOSPITALIST

## 2018-01-06 PROCEDURE — 85730 THROMBOPLASTIN TIME PARTIAL: CPT

## 2018-01-06 PROCEDURE — 700111 HCHG RX REV CODE 636 W/ 250 OVERRIDE (IP): Performed by: EMERGENCY MEDICINE

## 2018-01-06 RX ORDER — LIDOCAINE HYDROCHLORIDE AND EPINEPHRINE BITARTRATE 20; .01 MG/ML; MG/ML
10 INJECTION, SOLUTION SUBCUTANEOUS ONCE
Status: COMPLETED | OUTPATIENT
Start: 2018-01-06 | End: 2018-01-06

## 2018-01-06 RX ORDER — AMOXICILLIN 250 MG
2 CAPSULE ORAL 2 TIMES DAILY
Status: DISCONTINUED | OUTPATIENT
Start: 2018-01-07 | End: 2018-01-08 | Stop reason: HOSPADM

## 2018-01-06 RX ORDER — BISACODYL 10 MG
10 SUPPOSITORY, RECTAL RECTAL
Status: DISCONTINUED | OUTPATIENT
Start: 2018-01-06 | End: 2018-01-08 | Stop reason: HOSPADM

## 2018-01-06 RX ORDER — SODIUM CHLORIDE 9 MG/ML
500 INJECTION, SOLUTION INTRAVENOUS ONCE
Status: COMPLETED | OUTPATIENT
Start: 2018-01-06 | End: 2018-01-06

## 2018-01-06 RX ORDER — FINASTERIDE 5 MG/1
5 TABLET, FILM COATED ORAL DAILY
Status: DISCONTINUED | OUTPATIENT
Start: 2018-01-07 | End: 2018-01-08 | Stop reason: HOSPADM

## 2018-01-06 RX ORDER — SODIUM CHLORIDE 9 MG/ML
1000 INJECTION, SOLUTION INTRAVENOUS ONCE
Status: COMPLETED | OUTPATIENT
Start: 2018-01-06 | End: 2018-01-06

## 2018-01-06 RX ORDER — GEMFIBROZIL 600 MG/1
600 TABLET, FILM COATED ORAL
Status: DISCONTINUED | OUTPATIENT
Start: 2018-01-06 | End: 2018-01-08 | Stop reason: HOSPADM

## 2018-01-06 RX ORDER — DONEPEZIL HYDROCHLORIDE 5 MG/1
10 TABLET, FILM COATED ORAL DAILY
Status: DISCONTINUED | OUTPATIENT
Start: 2018-01-07 | End: 2018-01-08 | Stop reason: HOSPADM

## 2018-01-06 RX ORDER — PRAMIPEXOLE DIHYDROCHLORIDE 0.25 MG/1
0.75 TABLET ORAL 3 TIMES DAILY
Status: DISCONTINUED | OUTPATIENT
Start: 2018-01-06 | End: 2018-01-08 | Stop reason: HOSPADM

## 2018-01-06 RX ORDER — POLYETHYLENE GLYCOL 3350 17 G/17G
1 POWDER, FOR SOLUTION ORAL
Status: DISCONTINUED | OUTPATIENT
Start: 2018-01-06 | End: 2018-01-08 | Stop reason: HOSPADM

## 2018-01-06 RX ADMIN — SODIUM CHLORIDE 500 ML: 9 INJECTION, SOLUTION INTRAVENOUS at 19:32

## 2018-01-06 RX ADMIN — CARBIDOPA AND LEVODOPA 1 TABLET: 25; 100 TABLET ORAL at 22:57

## 2018-01-06 RX ADMIN — CLOSTRIDIUM TETANI TOXOID ANTIGEN (FORMALDEHYDE INACTIVATED), CORYNEBACTERIUM DIPHTHERIAE TOXOID ANTIGEN (FORMALDEHYDE INACTIVATED), BORDETELLA PERTUSSIS TOXOID ANTIGEN (GLUTARALDEHYDE INACTIVATED), BORDETELLA PERTUSSIS FILAMENTOUS HEMAGGLUTININ ANTIGEN (FORMALDEHYDE INACTIVATED), BORDETELLA PERTUSSIS PERTACTIN ANTIGEN, AND BORDETELLA PERTUSSIS FIMBRIAE 2/3 ANTIGEN 0.5 ML: 5; 2; 2.5; 5; 3; 5 INJECTION, SUSPENSION INTRAMUSCULAR at 21:42

## 2018-01-06 RX ADMIN — PRAMIPEXOLE DIHYDROCHLORIDE 0.75 MG: 0.25 TABLET ORAL at 22:58

## 2018-01-06 RX ADMIN — LIDOCAINE HYDROCHLORIDE AND EPINEPHRINE 10 ML: 20; 10 INJECTION, SOLUTION INFILTRATION; PERINEURAL at 21:15

## 2018-01-06 RX ADMIN — SODIUM CHLORIDE 1000 ML: 9 INJECTION, SOLUTION INTRAVENOUS at 21:50

## 2018-01-06 RX ADMIN — GEMFIBROZIL 600 MG: 600 TABLET ORAL at 22:57

## 2018-01-06 RX ADMIN — ASPIRIN 81 MG: 81 TABLET, COATED ORAL at 22:57

## 2018-01-06 ASSESSMENT — PATIENT HEALTH QUESTIONNAIRE - PHQ9
1. LITTLE INTEREST OR PLEASURE IN DOING THINGS: NOT AT ALL
SUM OF ALL RESPONSES TO PHQ QUESTIONS 1-9: 0
2. FEELING DOWN, DEPRESSED, IRRITABLE, OR HOPELESS: NOT AT ALL
SUM OF ALL RESPONSES TO PHQ9 QUESTIONS 1 AND 2: 0

## 2018-01-06 ASSESSMENT — PAIN SCALES - GENERAL
PAINLEVEL_OUTOF10: 0
PAINLEVEL_OUTOF10: 6

## 2018-01-06 ASSESSMENT — LIFESTYLE VARIABLES
HAVE PEOPLE ANNOYED YOU BY CRITICIZING YOUR DRINKING: NO
HOW MANY TIMES IN THE PAST YEAR HAVE YOU HAD 5 OR MORE DRINKS IN A DAY: 0
DO YOU DRINK ALCOHOL: YES
AVERAGE NUMBER OF DAYS PER WEEK YOU HAVE A DRINK CONTAINING ALCOHOL: 1
TOTAL SCORE: 0
EVER_SMOKED: YES
TOTAL SCORE: 0
ON A TYPICAL DAY WHEN YOU DRINK ALCOHOL HOW MANY DRINKS DO YOU HAVE: 2
EVER HAD A DRINK FIRST THING IN THE MORNING TO STEADY YOUR NERVES TO GET RID OF A HANGOVER: NO
TOTAL SCORE: 0
HAVE YOU EVER FELT YOU SHOULD CUT DOWN ON YOUR DRINKING: NO
EVER FELT BAD OR GUILTY ABOUT YOUR DRINKING: NO
CONSUMPTION TOTAL: NEGATIVE

## 2018-01-06 ASSESSMENT — ENCOUNTER SYMPTOMS
SHORTNESS OF BREATH: 0
FATIGUE: 0
NUMBNESS: 0
CONFUSION: 0
WEAKNESS: 0
FLANK PAIN: 0
WOUND: 1
DIZZINESS: 0
NECK STIFFNESS: 0
VOMITING: 1
FEVER: 0
TREMORS: 0
DIAPHORESIS: 0
ARTHRALGIAS: 1
NAUSEA: 0
ABDOMINAL PAIN: 0
NECK PAIN: 0
BACK PAIN: 0

## 2018-01-07 ENCOUNTER — APPOINTMENT (OUTPATIENT)
Dept: RADIOLOGY | Facility: MEDICAL CENTER | Age: 83
DRG: 244 | End: 2018-01-07
Attending: INTERNAL MEDICINE
Payer: MEDICARE

## 2018-01-07 LAB
ANION GAP SERPL CALC-SCNC: 5 MMOL/L (ref 0–11.9)
BUN SERPL-MCNC: 28 MG/DL (ref 8–22)
CALCIUM SERPL-MCNC: 9.3 MG/DL (ref 8.5–10.5)
CHLORIDE SERPL-SCNC: 112 MMOL/L (ref 96–112)
CO2 SERPL-SCNC: 25 MMOL/L (ref 20–33)
CREAT SERPL-MCNC: 1.16 MG/DL (ref 0.5–1.4)
EKG IMPRESSION: NORMAL
ERYTHROCYTE [DISTWIDTH] IN BLOOD BY AUTOMATED COUNT: 42.3 FL (ref 35.9–50)
GFR SERPL CREATININE-BSD FRML MDRD: >60 ML/MIN/1.73 M 2
GLUCOSE SERPL-MCNC: 118 MG/DL (ref 65–99)
HCT VFR BLD AUTO: 36.5 % (ref 42–52)
HGB BLD-MCNC: 12.7 G/DL (ref 14–18)
LV EJECT FRACT  99904: 65
LV EJECT FRACT MOD 2C 99903: 63.79
LV EJECT FRACT MOD 4C 99902: 60.03
LV EJECT FRACT MOD BP 99901: 60.88
MCH RBC QN AUTO: 31.5 PG (ref 27–33)
MCHC RBC AUTO-ENTMCNC: 34.8 G/DL (ref 33.7–35.3)
MCV RBC AUTO: 90.6 FL (ref 81.4–97.8)
PLATELET # BLD AUTO: 212 K/UL (ref 164–446)
PMV BLD AUTO: 11.6 FL (ref 9–12.9)
POTASSIUM SERPL-SCNC: 4.3 MMOL/L (ref 3.6–5.5)
RBC # BLD AUTO: 4.03 M/UL (ref 4.7–6.1)
SODIUM SERPL-SCNC: 142 MMOL/L (ref 135–145)
WBC # BLD AUTO: 8.1 K/UL (ref 4.8–10.8)

## 2018-01-07 PROCEDURE — 36415 COLL VENOUS BLD VENIPUNCTURE: CPT

## 2018-01-07 PROCEDURE — C1898 LEAD, PMKR, OTHER THAN TRANS: HCPCS

## 2018-01-07 PROCEDURE — 99232 SBSQ HOSP IP/OBS MODERATE 35: CPT | Performed by: HOSPITALIST

## 2018-01-07 PROCEDURE — 700101 HCHG RX REV CODE 250

## 2018-01-07 PROCEDURE — 700111 HCHG RX REV CODE 636 W/ 250 OVERRIDE (IP): Performed by: INTERNAL MEDICINE

## 2018-01-07 PROCEDURE — 93010 ELECTROCARDIOGRAM REPORT: CPT | Performed by: INTERNAL MEDICINE

## 2018-01-07 PROCEDURE — 304952 HCHG R 2 PADS

## 2018-01-07 PROCEDURE — 93005 ELECTROCARDIOGRAM TRACING: CPT | Performed by: INTERNAL MEDICINE

## 2018-01-07 PROCEDURE — 700105 HCHG RX REV CODE 258

## 2018-01-07 PROCEDURE — 93306 TTE W/DOPPLER COMPLETE: CPT | Mod: 26 | Performed by: INTERNAL MEDICINE

## 2018-01-07 PROCEDURE — 80048 BASIC METABOLIC PNL TOTAL CA: CPT

## 2018-01-07 PROCEDURE — 305387 HCHG SUTURES

## 2018-01-07 PROCEDURE — 93306 TTE W/DOPPLER COMPLETE: CPT

## 2018-01-07 PROCEDURE — 99152 MOD SED SAME PHYS/QHP 5/>YRS: CPT

## 2018-01-07 PROCEDURE — 304853 HCHG PPM TEST CABLE

## 2018-01-07 PROCEDURE — 85027 COMPLETE CBC AUTOMATED: CPT

## 2018-01-07 PROCEDURE — 0JH606Z INSERTION OF PACEMAKER, DUAL CHAMBER INTO CHEST SUBCUTANEOUS TISSUE AND FASCIA, OPEN APPROACH: ICD-10-PCS | Performed by: INTERNAL MEDICINE

## 2018-01-07 PROCEDURE — C1894 INTRO/SHEATH, NON-LASER: HCPCS

## 2018-01-07 PROCEDURE — 02HK3JZ INSERTION OF PACEMAKER LEAD INTO RIGHT VENTRICLE, PERCUTANEOUS APPROACH: ICD-10-PCS | Performed by: INTERNAL MEDICINE

## 2018-01-07 PROCEDURE — 02H63JZ INSERTION OF PACEMAKER LEAD INTO RIGHT ATRIUM, PERCUTANEOUS APPROACH: ICD-10-PCS | Performed by: INTERNAL MEDICINE

## 2018-01-07 PROCEDURE — 700102 HCHG RX REV CODE 250 W/ 637 OVERRIDE(OP): Performed by: HOSPITALIST

## 2018-01-07 PROCEDURE — 71045 X-RAY EXAM CHEST 1 VIEW: CPT

## 2018-01-07 PROCEDURE — C1785 PMKR, DUAL, RATE-RESP: HCPCS

## 2018-01-07 PROCEDURE — 700111 HCHG RX REV CODE 636 W/ 250 OVERRIDE (IP)

## 2018-01-07 PROCEDURE — 770020 HCHG ROOM/CARE - TELE (206)

## 2018-01-07 PROCEDURE — A9270 NON-COVERED ITEM OR SERVICE: HCPCS | Performed by: HOSPITALIST

## 2018-01-07 PROCEDURE — C1892 INTRO/SHEATH,FIXED,PEEL-AWAY: HCPCS

## 2018-01-07 PROCEDURE — 33208 INSRT HEART PM ATRIAL & VENT: CPT

## 2018-01-07 RX ORDER — CEFAZOLIN SODIUM 1 G/3ML
INJECTION, POWDER, FOR SOLUTION INTRAMUSCULAR; INTRAVENOUS
Status: COMPLETED
Start: 2018-01-07 | End: 2018-01-07

## 2018-01-07 RX ORDER — BUPIVACAINE HYDROCHLORIDE 2.5 MG/ML
INJECTION, SOLUTION EPIDURAL; INFILTRATION; INTRACAUDAL
Status: COMPLETED
Start: 2018-01-07 | End: 2018-01-07

## 2018-01-07 RX ORDER — SODIUM CHLORIDE 9 MG/ML
INJECTION, SOLUTION INTRAVENOUS
Status: COMPLETED
Start: 2018-01-07 | End: 2018-01-07

## 2018-01-07 RX ORDER — MIDAZOLAM HYDROCHLORIDE 1 MG/ML
INJECTION INTRAMUSCULAR; INTRAVENOUS
Status: COMPLETED
Start: 2018-01-07 | End: 2018-01-07

## 2018-01-07 RX ORDER — CEFAZOLIN SODIUM 1 G/3ML
1 INJECTION, POWDER, FOR SOLUTION INTRAMUSCULAR; INTRAVENOUS ONCE
Status: DISCONTINUED | OUTPATIENT
Start: 2018-01-07 | End: 2018-01-07

## 2018-01-07 RX ORDER — LIDOCAINE HYDROCHLORIDE 20 MG/ML
INJECTION, SOLUTION INFILTRATION; PERINEURAL
Status: COMPLETED
Start: 2018-01-07 | End: 2018-01-07

## 2018-01-07 RX ADMIN — FENTANYL CITRATE 50 MCG: 50 INJECTION, SOLUTION INTRAMUSCULAR; INTRAVENOUS at 12:29

## 2018-01-07 RX ADMIN — SODIUM CHLORIDE 250 ML: 9 INJECTION, SOLUTION INTRAVENOUS at 17:21

## 2018-01-07 RX ADMIN — GEMFIBROZIL 600 MG: 600 TABLET ORAL at 17:18

## 2018-01-07 RX ADMIN — CARBIDOPA AND LEVODOPA 1 TABLET: 25; 100 TABLET ORAL at 17:18

## 2018-01-07 RX ADMIN — CARBIDOPA AND LEVODOPA 1 TABLET: 25; 100 TABLET ORAL at 20:41

## 2018-01-07 RX ADMIN — MIDAZOLAM 2 MG: 1 INJECTION INTRAMUSCULAR; INTRAVENOUS at 12:29

## 2018-01-07 RX ADMIN — ASPIRIN 81 MG: 81 TABLET, COATED ORAL at 20:41

## 2018-01-07 RX ADMIN — PRAMIPEXOLE DIHYDROCHLORIDE 0.75 MG: 0.25 TABLET ORAL at 08:45

## 2018-01-07 RX ADMIN — FINASTERIDE 5 MG: 5 TABLET, FILM COATED ORAL at 08:45

## 2018-01-07 RX ADMIN — GEMFIBROZIL 600 MG: 600 TABLET ORAL at 06:27

## 2018-01-07 RX ADMIN — DONEPEZIL HYDROCHLORIDE 10 MG: 5 TABLET, FILM COATED ORAL at 08:45

## 2018-01-07 RX ADMIN — CARBIDOPA AND LEVODOPA 1 TABLET: 25; 100 TABLET ORAL at 08:45

## 2018-01-07 RX ADMIN — STANDARDIZED SENNA CONCENTRATE AND DOCUSATE SODIUM 2 TABLET: 8.6; 5 TABLET, FILM COATED ORAL at 20:41

## 2018-01-07 RX ADMIN — LIDOCAINE HYDROCHLORIDE: 20 INJECTION, SOLUTION INFILTRATION; PERINEURAL at 12:29

## 2018-01-07 RX ADMIN — CARBIDOPA AND LEVODOPA 1 TABLET: 25; 100 TABLET ORAL at 13:41

## 2018-01-07 RX ADMIN — BUPIVACAINE HYDROCHLORIDE: 2.5 INJECTION, SOLUTION EPIDURAL; INFILTRATION; INTRACAUDAL; PERINEURAL at 12:28

## 2018-01-07 RX ADMIN — PRAMIPEXOLE DIHYDROCHLORIDE 0.75 MG: 0.25 TABLET ORAL at 20:41

## 2018-01-07 RX ADMIN — CEFAZOLIN 3000 MG: 330 INJECTION, POWDER, FOR SOLUTION INTRAMUSCULAR; INTRAVENOUS at 12:00

## 2018-01-07 RX ADMIN — PRAMIPEXOLE DIHYDROCHLORIDE 0.75 MG: 0.25 TABLET ORAL at 14:44

## 2018-01-07 RX ADMIN — CEFAZOLIN SODIUM 1 G: 1 INJECTION, SOLUTION INTRAVENOUS at 17:21

## 2018-01-07 ASSESSMENT — ENCOUNTER SYMPTOMS
PHOTOPHOBIA: 0
HALLUCINATIONS: 0
DIZZINESS: 0
EYE DISCHARGE: 0
SHORTNESS OF BREATH: 0
MYALGIAS: 0
CHILLS: 0
HEADACHES: 0
DIAPHORESIS: 0
SPEECH CHANGE: 0
BACK PAIN: 0
FALLS: 1
SORE THROAT: 0
PALPITATIONS: 0
LOSS OF CONSCIOUSNESS: 1
DEPRESSION: 0
WHEEZING: 0
FEVER: 0
WEAKNESS: 0
ABDOMINAL PAIN: 0
ORTHOPNEA: 0
VOMITING: 0
CLAUDICATION: 0
FLANK PAIN: 0
TINGLING: 0
PND: 0
SENSORY CHANGE: 0
EYE REDNESS: 0
TREMORS: 0
FOCAL WEAKNESS: 0
COUGH: 0
NECK PAIN: 0
STRIDOR: 0
DIARRHEA: 0
NAUSEA: 0

## 2018-01-07 ASSESSMENT — PAIN SCALES - GENERAL
PAINLEVEL_OUTOF10: 0
PAINLEVEL_OUTOF10: 0
PAINLEVEL_OUTOF10: 2

## 2018-01-07 ASSESSMENT — LIFESTYLE VARIABLES: SUBSTANCE_ABUSE: 0

## 2018-01-07 NOTE — ED NOTES
Pt BIB [facility] from [facility] after collapsing with +LOC. Pt has small lac above Rt eye with scan bleeding. Petechia noted to Rt and Lft cheek and mandible. PT was altered on scene per [facility], found to be in a 2' heart block. Pt now A&Ox4, 92% on room air placed on 2 liters per NC. Family at bedside with medications great historians. Pt resting comfortably. NS infusing, VSS. Second PIV placed, blood sent to lab

## 2018-01-07 NOTE — PROGRESS NOTES
Received report and assumed care of patient. Patient is alert and oriented x4. Patient is in no signs of distress and denies pain at this time. Patient is NPO for pacer placement today. Patient was updated on the plan of care for the day. Call light within reach, bed in low position, 2 side rails up. Educated on fall risk, verbalizes understanding. Will continue to monitor

## 2018-01-07 NOTE — PROGRESS NOTES
2 person skin check:  3 absorbable sutures to right eyebrow  Facial bruising on chin and bilat cheeks  Right big toe callous and dry

## 2018-01-07 NOTE — ED PROVIDER NOTES
ED Provider Note    ED Provider Note          CHIEF COMPLAINT  Chief Complaint   Patient presents with   • Syncope   • T-5000 GLF       HPI  Shukri Morrow is a 82 y.o. male who presents to the Emergency Department by EMS from the [facility] in [place] after having a witnessed fall. Per report it was possibly a mechanical fall however he did hit his head and have positive loss of consciousness. He woke up immediately after. He did have one episode of emesis in route got a dose of Zofran. He said he felt really fine. Per report he is a very active leif at baseline. Up until about 2 years ago with an avid cross-country skier works at [employer]. He has had some deconditioning last couple years. He does have reported history of Parkinson disease but lives very independently. He says he has never seen a cardiologist before. He denies any chest pain or shortness of breath. He denies any previous syncopal events. He does have a little bit of dull right shoulder pain as well but no complaints of any weakness or numbness. Per report he thinks he is possibly up-to-date on his tetanus shot    REVIEW OF SYSTEMS  Review of Systems   Constitutional: Negative for diaphoresis, fatigue and fever.   HENT: Negative for congestion.    Eyes: Negative for visual disturbance.   Respiratory: Negative for shortness of breath.    Cardiovascular: Negative for chest pain.   Gastrointestinal: Positive for vomiting. Negative for abdominal pain and nausea.   Genitourinary: Negative for flank pain.   Musculoskeletal: Positive for arthralgias. Negative for back pain, neck pain and neck stiffness.   Skin: Positive for wound.   Neurological: Negative for dizziness, tremors, weakness and numbness.   Psychiatric/Behavioral: Negative for confusion.       PAST MEDICAL HISTORY   has a past medical history of Arrhythmia; BPH (benign prostatic hyperplasia); Bronchitis; Cataract; Fall; Hyperlipidemia; Multiple cerebral infarctions (CMS-HCC) (6/27/2016); Parkinson  "disease (CMS-HCC); Pneumonia; Psychiatric problem; Stroke (CMS-HCC); Thoracic compression fracture (CMS-HCC) (7/7/2015); and Urinary incontinence.    SURGICAL HISTORY  patient denies any surgical history    SOCIAL HISTORY  Social History   Substance Use Topics   • Smoking status: Former Smoker     Packs/day: 1.00     Years: 30.00     Quit date: 1/1/1990   • Smokeless tobacco: Former User     Quit date: 1/1/1993   • Alcohol use Yes      Comment: only on special occassions       History   Drug Use No       FAMILY HISTORY  Family History   Problem Relation Age of Onset   • Diabetes Mother        CURRENT MEDICATIONS  Reviewed.  See Encounter Summary.     ALLERGIES  No Known Allergies    PHYSICAL EXAM  VITAL SIGNS: /63   Pulse 73   Temp 36.7 °C (98.1 °F)   Resp 16   Ht 1.727 m (5' 8\")   Wt 78.6 kg (173 lb 4.5 oz)   SpO2 97%   BMI 26.35 kg/m²   Physical Exam   Constitutional: He is oriented to person, place, and time. No distress.   HENT:   Head: Normocephalic.   Right Ear: External ear normal.   Left Ear: External ear normal.   Mouth/Throat: Oropharynx is clear and moist.   2 cm laceration over the right eye   Eyes: Conjunctivae and EOM are normal. Pupils are equal, round, and reactive to light.   Neck: Normal range of motion.   No midline tenderness of the C-spine   Cardiovascular: Normal rate.    Pulmonary/Chest: Effort normal and breath sounds normal.   Abdominal: Soft. Bowel sounds are normal.   Musculoskeletal: Normal range of motion. He exhibits no edema, tenderness or deformity.   No joint line tenderness of the right shoulder with intact range of motion and no midline tenderness of thoracic or lumbar spine   Neurological: He is alert and oriented to person, place, and time.   Skin: Skin is warm. He is not diaphoretic.   Psychiatric: He has a normal mood and affect.           DIAGNOSTIC STUDIES / PROCEDURES     LABS  Labs Reviewed   CBC WITH DIFFERENTIAL - Abnormal; Notable for the following:        " Result Value    WBC 4.7 (*)     RBC 4.66 (*)     Hematocrit 41.6 (*)     MCHC 35.8 (*)     Immature Granulocytes 1.10 (*)     All other components within normal limits    Narrative:     Indicate which anticoagulants the patient is on:->UNKNOWN   COMP METABOLIC PANEL - Abnormal; Notable for the following:     Glucose 120 (*)     Bun 29 (*)     All other components within normal limits    Narrative:     Indicate which anticoagulants the patient is on:->UNKNOWN   BTYPE NATRIURETIC PEPTIDE - Abnormal; Notable for the following:     B Natriuretic Peptide 124 (*)     All other components within normal limits    Narrative:     Indicate which anticoagulants the patient is on:->UNKNOWN   ESTIMATED GFR - Abnormal; Notable for the following:     GFR If Non  56 (*)     All other components within normal limits    Narrative:     Indicate which anticoagulants the patient is on:->UNKNOWN   TROPONIN    Narrative:     Indicate which anticoagulants the patient is on:->UNKNOWN   PROTHROMBIN TIME    Narrative:     Indicate which anticoagulants the patient is on:->UNKNOWN   APTT    Narrative:     Indicate which anticoagulants the patient is on:->UNKNOWN   BASIC METABOLIC PANEL   CBC WITHOUT DIFFERENTIAL       All labs were reviewed by me.    EKG Time completed  703P  12 Lead EKG interpreted by me to show:  EKG shows sinus rhythm with a rate of 65 however ID is 400, QTc 412,  shows a Mobitz type II AV block with prolonged ID's and then dropped beat but no ST segment elevations  No EKGs to compare    RADIOLOGY  DX-SHOULDER 2+ RIGHT   Final Result      No radiographic evidence of acute traumatic injury.      DX-CHEST-PORTABLE (1 VIEW)   Final Result      No acute cardiopulmonary abnormality.      CT-CSPINE WITHOUT PLUS RECONS   Final Result         1.  Negative CT scan of the cervical spine.  No fracture or subluxation.      2.  Mild degenerative change involving C1-2 and C4-5 through C6-7.         CT-HEAD W/O    Final Result      1.  No acute intracranial process.      2.  Atrophy and small vessel ischemic change.      3.  Small cephalohematoma in the right supraorbital region.      4.  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      ECHOCARDIOGRAM COMP W/O CONT    (Results Pending)     The radiologist's interpretation of all radiological studies have been reviewed by me.    Laceration procedure note.  Location of the wound was located over the right eye. It is a 2 cm laceration. It was copiously irrigated with normal saline. There is no underlying neurovascular injury. After the wound was cleaned and irrigated it with anesthetized with 2 mL of 1% lidocaine with epinephrine. Using 5-0 fast absorbing chromic gut 3 simple interrupted sutures were applied. Patient tolerated the procedure well.    COURSE & MEDICAL DECISION MAKING  Pertinent Labs & Imaging studies reviewed. (See chart for details)    7:18 PM - Patient seen and examined at bedside.     Decision Making:  This is a 82 y.o. year old male who presents After having a fall with loss of consciousness while in [facility]. He presented here distress. He is awake and alert. He did have a laceration over his eye but extraocular motions were intact and pupils were equal round reactive. He denies any chest pain or shortness of breath either. He was hemodynamically stable heart was EKG did show concern for prolonged NM interval and a Mobitz type II block. When I splinted able speak with his wife sounds like the story was more concerning that he was walking with a trach, dropped the tray and then he collapsed. I think is possible that the arrhythmia is related to the syncopal event and that this was not a mechanical fall.  I consulted Dr. Burger who recommended a permanent pacemaker to be placed tomorrow. Patient was also admitted to the hospitalist as well. The crash cart was placed in the patient's room and is placed with transient cutaneous pacer is just as a  precaution in the meantime.    A total of 30 metastases of critical care time was spent on this patient given the resuscitation, trauma evaluation in concern of his significant arrhythmia.    DISPOSITION:  admitted    FINAL IMPRESSION  1. Abrasion    2. Syncope, unspecified syncope type    3. Mobitz type II block

## 2018-01-07 NOTE — CONSULTS
DATE OF CONSULTATION: 01/07/18    CONSULT REQUESTED BY: Dr. Crowley on behalf aof Dr. Warner, admitting hospitalist    REASON FOR CONSULTATION: syncope with pauses    HISTORY OF PRESENT ILLNESS:   Shukri Morrow is a 82 y.o. male with a history of Parkinson who presents with syncope and fall with facial trauma from the fall.    He has never passed out before tonight.  Not much warning but does not remember it well because he hit his head.  Wife witness and says he just went down and  reprots they had sat down to have lunch and when standing up  he had a sudden syncopal episode. He remembers waking up on the ground. no urinary or bowel incontinence     Not sure of any trigger.  Not any dizziness in past.  Unsure if any heart block before.  Er ecg ready as diamante 2 but appears wenckebach    ALLERGY:  No Known Allergies    MEDICATIONS:    Current Facility-Administered Medications:   •  pneumococcal 13-Swathi Conj Vacc (PREVNAR 13) syringe 0.5 mL, 0.5 mL, Intramuscular, Once PRN, Aura Warner M.D.  •  pramipexole (MIRAPEX) tablet 0.75 mg, 0.75 mg, Oral, TID, Aura Warner M.D., 0.75 mg at 01/06/18 2258  •  gemfibrozil (LOPID) tablet 600 mg, 600 mg, Oral, BID AC, Aura Warner M.D., 600 mg at 01/06/18 2257  •  finasteride (PROSCAR) tablet 5 mg, 5 mg, Oral, DAILY, Aura Warner M.D.  •  donepezil (ARICEPT) tablet 10 mg, 10 mg, Oral, DAILY, Aura Warner M.D.  •  carbidopa-levodopa (SINEMET)  MG tablet 1 Tab, 1 Tab, Oral, 4X/DAY, Aura Warner M.D., 1 Tab at 01/06/18 2257  •  aspirin EC (ECOTRIN) tablet 81 mg, 81 mg, Oral, QHS, Aura Warner M.D., 81 mg at 01/06/18 2257  •  senna-docusate (PERICOLACE or SENOKOT S) 8.6-50 MG per tablet 2 Tab, 2 Tab, Oral, BID **AND** polyethylene glycol/lytes (MIRALAX) PACKET 1 Packet, 1 Packet, Oral, QDAY PRN **AND** magnesium hydroxide (MILK OF MAGNESIA) suspension 30 mL, 30 mL, Oral, QDAY PRN **AND** bisacodyl (DULCOLAX) suppository 10 mg, 10 mg, Rectal, QDAY PRN, Aura AKHTAR.  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 No pertinent surgical history.    FAMILY HISTORY:  Family History   Problem Relation Age of Onset   • Diabetes Mother      No pacemaker in family members    SOCIAL HISTORY:  History   Smoking Status   • Former Smoker   • Packs/day: 1.00   • Years: 30.00   • Quit date: 1/1/1990   Smokeless Tobacco   • Former User   • Quit date: 1/1/1993     History   Alcohol Use   • Yes     Comment: only on special occassions          REVIEW OF SYSTEMS:  Review of Systems   Cardiovascular: Negative for chest pain and palpitations.   Neurological: Positive for loss of consciousness. Negative for dizziness.       PHYSICAL EXAMINATION:  Patient Vitals for the past 24 hrs:   BP Temp Pulse Resp SpO2 Height Weight   01/07/18 0000 104/63 36.7 °C (98.1 °F) 73 16 97 % - -   01/06/18 2228 - - - - - 1.727 m (5' 8\") 78.6 kg (173 lb 4.5 oz)   01/06/18 2200 118/54 36.7 °C (98 °F) 62 16 97 % - -   01/06/18 2130 - - 71 15 95 % - -   01/06/18 2100 - - 73 (!) 8 100 % - -   01/06/18 2030 - - 67 18 98 % - -   01/06/18 1930 - - (!) 56 (!) 9 94 % - -   01/06/18 1906 112/53 37.1 °C (98.7 °F) 66 14 92 % 1.753 m (5' 9\") 77.1 kg (170 lb)       Physical Exam   Constitutional: He is oriented to person, place, and time. He appears well-developed and well-nourished. No distress.   HENT:   Head: Normocephalic.   Right Ear: External ear normal.   Left Ear: External ear normal.   Nose: Nose normal.   Mouth/Throat: Oropharynx is clear and moist.   Laceration at right brow   Eyes: Conjunctivae " and EOM are normal. Pupils are equal, round, and reactive to light. Right eye exhibits no discharge. Left eye exhibits no discharge. No scleral icterus.   Neck: Normal range of motion. Neck supple. No JVD present. No tracheal deviation present. No thyromegaly present.   Cardiovascular: Normal heart sounds and intact distal pulses.  An irregular rhythm present. Bradycardia present.  Exam reveals no gallop and no friction rub.    No murmur heard.  Pulmonary/Chest: Effort normal and breath sounds normal. No stridor. No respiratory distress. He has no wheezes. He has no rales. He exhibits no tenderness.   Abdominal: Soft. Bowel sounds are normal. He exhibits no distension. There is no tenderness. There is no rebound and no guarding.   Musculoskeletal: Normal range of motion. He exhibits no edema.   Neurological: He is alert and oriented to person, place, and time. No cranial nerve deficit. Coordination normal.   Skin: Skin is warm and dry. He is not diaphoretic. No erythema.   Petechial rash in front of ears bilaterally   Psychiatric: He has a normal mood and affect. His behavior is normal. Judgment and thought content normal.   Vitals reviewed.        DATA:  Lab Results   Component Value Date/Time    SODIUM 139 01/06/2018 07:29 PM    POTASSIUM 4.0 01/06/2018 07:29 PM    CHLORIDE 107 01/06/2018 07:29 PM    CO2 25 01/06/2018 07:29 PM    GLUCOSE 120 (H) 01/06/2018 07:29 PM    BUN 29 (H) 01/06/2018 07:29 PM    CREATININE 1.25 01/06/2018 07:29 PM      Lab Results   Component Value Date/Time    PROTHROMBTM 13.8 01/06/2018 07:29 PM    INR 1.09 01/06/2018 07:29 PM      Lab Results   Component Value Date/Time    WBC 4.7 (L) 01/06/2018 07:29 PM    RBC 4.66 (L) 01/06/2018 07:29 PM    HEMOGLOBIN 14.9 01/06/2018 07:29 PM    HEMATOCRIT 41.6 (L) 01/06/2018 07:29 PM    MCV 89.3 01/06/2018 07:29 PM    MCH 32.0 01/06/2018 07:29 PM    MCHC 35.8 (H) 01/06/2018 07:29 PM    MPV 11.0 01/06/2018 07:29 PM    NEUTSPOLYS 59.50 01/06/2018 07:29  PM    LYMPHOCYTES 25.50 01/06/2018 07:29 PM    MONOCYTES 9.90 01/06/2018 07:29 PM    EOSINOPHILS 3.40 01/06/2018 07:29 PM    BASOPHILS 0.60 01/06/2018 07:29 PM      Trop undetectable      ECG personally evaluated and interpreted by me: the pr starts long with av block cylce but is longer on last beats than first beat  CXR personally evaluated and interpreted by me: nad  ECHO: ordered      ASSESSMENT:  Syncope and found to have second degree av block on ecg.  Some longer pauses on tele suggesting higher grade heart block.  This is most likely cause of syncope.  Unsure what to make of petechial rash  Even if this is not the cuase with this malignant of syncope, would warrant ilr at a minimum but with highly likley ppm indication would go with ppm    PLAN:  Ppm if echo normal.  Will reformulate plan if any significant abnormality

## 2018-01-07 NOTE — PROGRESS NOTES
Cardiology Progress Note               Author: Edgar Ortiz Date & Time created: 2018  11:12 AM       Consultation for syncope with pauses (found to be in second-degree AV block on EKG)      Admitted with syncopal episode and right facial laceration      History of benign prostatic hyperplasia, hyperlipidemia, Parkinson disease    Labs reviewed  Sodium, potassium, creatinine stable      BP = 95/50   HR =50's        Review of Systems   Respiratory: Negative for cough and shortness of breath.    Cardiovascular: Negative for chest pain, palpitations, orthopnea, claudication, leg swelling and PND.       Physical Exam   Constitutional: He is oriented to person, place, and time.   HENT:   Head: Normocephalic.   Eyes: Conjunctivae are normal.   Neck: No JVD present. No thyromegaly present.   Cardiovascular: Bradycardia present.    Pulses:       Carotid pulses are 2+ on the right side, and 2+ on the left side.       Radial pulses are 2+ on the right side, and 2+ on the left side.   Pulmonary/Chest: He has no wheezes.   Abdominal: Soft.   Musculoskeletal: He exhibits no edema.   Neurological: He is alert and oriented to person, place, and time.   Skin: Skin is warm and dry.       Hemodynamics:  Temp (24hrs), Av.9 °C (98.4 °F), Min:36.7 °C (98 °F), Max:37.1 °C (98.8 °F)  Temperature: 37.1 °C (98.8 °F)  Pulse  Av.1  Min: 52  Max: 73Heart Rate (Monitored): (!) 53  Blood Pressure : (!) 95/52 (RN notified), NIBP: (!) 90/50     Respiratory:    Respiration: 18, Pulse Oximetry: 96 %        RUL Breath Sounds: Clear, RML Breath Sounds: Clear;Diminished, RLL Breath Sounds: Diminished, EMELYN Breath Sounds: Clear, LLL Breath Sounds: Diminished  Fluids:  Date 18 0700 - 18 0659   Shift 4210-4485 4100-8779 1042-3048 24 Hour Total   I  N  T  A  K  E   Shift Total       O  U  T  P  U  T   Urine 350   350    Shift Total 350   350   Weight (kg) 78.6 78.6 78.6 78.6       Weight: 78.6 kg (173 lb 4.5  oz)  GI/Nutrition:  Orders Placed This Encounter   Procedures   • DIET NPO     Standing Status:   Standing     Number of Occurrences:   1     Order Specific Question:   Restrict to:     Answer:   Sips with Medications [3]     Lab Results:  Recent Labs      01/06/18 1929 01/07/18 0219   WBC  4.7*  8.1   RBC  4.66*  4.03*   HEMOGLOBIN  14.9  12.7*   HEMATOCRIT  41.6*  36.5*   MCV  89.3  90.6   MCH  32.0  31.5   MCHC  35.8*  34.8   RDW  42.1  42.3   PLATELETCT  219  212   MPV  11.0  11.6     Recent Labs      01/06/18 1929 01/07/18 0219   SODIUM  139  142   POTASSIUM  4.0  4.3   CHLORIDE  107  112   CO2  25  25   GLUCOSE  120*  118*   BUN  29*  28*   CREATININE  1.25  1.16   CALCIUM  9.7  9.3     Recent Labs      01/06/18 1929   APTT  28.9   INR  1.09     Recent Labs      01/06/18 1929   BNPBTYPENAT  124*     Recent Labs      01/06/18 1929   TROPONINI  <0.01   BNPBTYPENAT  124*             Medical Decision Making, by Problem:  Active Hospital Problems    Diagnosis   • *Mobitz type 2 second degree heart block [I44.1]   • Syncope [R55]   • Parkinson disease (CMS-HCC) [G20]       Assessment/Plan:    Presented for syncopal episodes    Evidence of high degree AV block    Plan for pacemaker today    Echocardiogram today    Reviewed items::  Medications reviewed and Labs reviewed

## 2018-01-07 NOTE — ED NOTES
Med rec complete per pt and family at bedside with some of pt's RX bottles  Allergies reviewed - NKDA  No ABX in last month

## 2018-01-07 NOTE — PROGRESS NOTES
"Pt admitted at 2200 to [facility]-1 from ED per cart. Pt able to ambulate from cart to bed without difficulty, denied any dizziness.  Verified cardiac monitor placement upon admit.  Pt wife states the patient \"hallucinates at night and sees people who aren't there.  He walks around the house frequently and doesn't sleep much at night, and sometimes he goes outside in the middle of the night\".  Bed alarm activated, hourly rounding.    "

## 2018-01-07 NOTE — PROCEDURES
PROCEDURE PERFORMED: dual chamber pacemaker implantation    : Trever Burger MD    ASSISTANT: none    ANESTHESIA: Local with moderate sedation    EBL: 30 cc    SPECIMENS: None    INDICATION: syncope with heart block    PRE PROCEDURE ECG: sinus rhythm with first degree av block    POST PROCEDURE ECG: AV dual pacing     COMPLICATIONS:  None apparent     DESCRIPTION OF PROCEDURE:  After informed written consent, the patient was brought to the electrophysiology lab in the fasting, unsedated state. The patient was prepped and draped in the usual sterile fashion. The procedure was performed under moderate sedation with local anesthetic.  Jonna Grewal RN administed fentanyl and versed under my supervison from 1203 to 1224 for total of 21 minutes.   A left infraclavicular incision was made with a scalpel and the pectoral device pocket was created using a combination of blunt dissection and electrocautery. The modified Seldinger technique with micropuncture was used to gain access to the left axillary vein. A peel-away hemostasis sheath was placed in the vein. Under fluoroscopic guidance, the pacemaker leads were introduced into the heart. The ventricular lead was advanced to the RVOT and then lowered into position at the RV apex. The atrial lead was positioned in the Right atrial appendage. The leads were tested and had satisfactory sensing and pacing parameters. High output ventricular pacing did not produce extracardiac stimulation. The leads were sutured to the underlying pectoral muscle with interrupted non absorbable suture over a silastic suture sleeve. The device pocket was irrigated with antibiotic solution, inspected, and no bleeding was seen. The leads were connected to the dual chamber pulse generator and the device was inserted into the pocket. The wound was closed with three layers of absorbable sutures. Following recovery from sedation, the patient was transferred to a monitored bed in good  condition.     IMPLANTED DEVICE INFORMATION:  Pulse generator is a [employer] (SJM) model [id number]                                  Serial # [id number]    LEAD INFORMATION:  1)Right atrial lead is a SJM model # [id number] , serial # [id number] ,P wave 1.7 millivolts, threshold 0.8 Volts, pacing impedance 520 Ohms.    2)Right ventricular lead is a SJM model # [id number] , serial # [id number] ,R wave 11 millivolts, threshold 0.5 Volts, pacing impedance 693 Ohms.    DEVICE PROGRAMMING:  DDDR     FLUOROSCOPY TIME: 1 minute    IMPRESSIONS:  1. Successful dual chamber pacmaker  Implantation  2. Uneventful conscious sedation    RECOMMENDATIONS:  1. Transfer to monitored bed  2. PA and lateral chest x-ray  3. Device interrogation prior to hospital discharge  4. Followup in device clinic

## 2018-01-07 NOTE — H&P
Hospital Medicine History and Physical    Date of Service  1/6/2018    Chief Complaint  Chief Complaint   Patient presents with   • Syncope   • T-5000 GLF       History of Presenting Illness  82 y.o. male who presented on 1/6/2018 with a syncopal episode and right facial laceration. This is a pleasant gentleman who states [facility] and had been feeling in his usual state of health. They had sat down to have lunch, following this, patient had been standing up and was bent over to  his tray when he had a sudden syncopal episode. He has no memory of the episode except for when he woke up on the ground. He struck his head on the table on the way down. He denies any previous headaches, chest pain, lightheadedness, or shortness of breath. He did lose consciousness and this was a witnessed episode by his wife. This has not happened before. Following, the patient had no urinary or bowel incontinence and did not suffer any trauma to his mouth or lips. Upon assessment in the ER, the patient is noted to have a Mobitz type II heart block. Cardiology was consulted.        Primary Care Physician  Maurice Keene M.D.    Consultants  Dr. Burger, cardiology    Code Status  Full    Review of Systems  Review of Systems   Constitutional: Negative for chills and fever.   HENT: Negative for congestion and sore throat.    Eyes: Negative for photophobia.   Respiratory: Negative for cough, shortness of breath and wheezing.    Cardiovascular: Negative for chest pain and palpitations.   Gastrointestinal: Negative for abdominal pain, diarrhea, nausea and vomiting.   Genitourinary: Negative for dysuria.   Musculoskeletal: Negative for myalgias.   Skin: Negative.    Neurological: Negative for dizziness, tingling, focal weakness and headaches.   Psychiatric/Behavioral: Negative for depression and suicidal ideas.        Past Medical History  Past Medical History:   Diagnosis Date   • Multiple  cerebral infarctions (CMS-HCC) 2016   • Thoracic compression fracture (CMS-HCC) 2015    Overview:  T5 based on CT scan 7/15   • BPH (benign prostatic hyperplasia)    • Hyperlipidemia    • Parkinson disease (CMS-HCC)        Surgical History  No past surgical history on file.    Medications  No current facility-administered medications on file prior to encounter.      Current Outpatient Prescriptions on File Prior to Encounter   Medication Sig Dispense Refill   • LYCOPENE PO Take  by mouth.     • finasteride (PROSCAR) 5 MG Tab Take 1 Tab by mouth every day. 90 Tab 1   • Pramipexole Dihydrochloride 0.75 MG Tab Take 1 Tab by mouth 3 times a day. 90 Tab 2   • gemfibrozil (LOPID) 600 MG Tab Take 1 Tab by mouth 2 times a day. 60 Tab 2   • donepezil (ARICEPT) 10 MG tablet Take 1 Tab by mouth every day. 30 Tab 2   • carbidopa-levodopa (SINEMET)  MG Tab Take 1 Tab by mouth 4 times a day. 120 Tab 2   • therapeutic multivitamin-minerals (THERAGRAN-M) Tab Take 1 Tab by mouth every day.     • aspirin 81 MG tablet Take 81 mg by mouth every day.         Family History  Family History   Problem Relation Age of Onset   • Diabetes Mother        Social History  Social History   Substance Use Topics   • Smoking status: Former Smoker     Packs/day: 1.00     Years: 30.00     Quit date: 1990   • Smokeless tobacco: Former User     Quit date: 1993   • Alcohol use Yes      Comment: only on special occassions        Allergies  No Known Allergies     Physical Exam  Laboratory   Hemodynamics  Temp (24hrs), Av.1 °C (98.7 °F), Min:37.1 °C (98.7 °F), Max:37.1 °C (98.7 °F)   Temperature: 37.1 °C (98.7 °F)  Pulse  Av  Min: 56  Max: 66 Heart Rate (Monitored): (!) 55  Blood Pressure : 112/53, NIBP: 120/54      Respiratory      Respiration: (!) 9, Pulse Oximetry: 94 %             Physical Exam   Constitutional: He is oriented to person, place, and time. No distress.   Elderly, frail   HENT:   Head: Normocephalic.   Right  Ear: External ear normal.   Left Ear: External ear normal.   Right eyebrow laceration   Eyes: EOM are normal. Right eye exhibits no discharge. Left eye exhibits no discharge.   Neck: Neck supple. No JVD present.   Cardiovascular: Normal rate, regular rhythm and normal heart sounds.    Pulmonary/Chest: Effort normal and breath sounds normal. No respiratory distress. He exhibits no tenderness.   Abdominal: Soft. Bowel sounds are normal. He exhibits no distension. There is no tenderness.   Musculoskeletal: He exhibits no edema.   Neurological: He is alert and oriented to person, place, and time. No cranial nerve deficit.   Skin: Skin is dry. He is not diaphoretic. No erythema.   Psychiatric: He has a normal mood and affect. His behavior is normal.   Nursing note and vitals reviewed.    Capillary refill less than 3 seconds, distal pulses intact    Recent Labs      01/06/18 1929   WBC  4.7*   RBC  4.66*   HEMOGLOBIN  14.9   HEMATOCRIT  41.6*   MCV  89.3   MCH  32.0   MCHC  35.8*   RDW  42.1   PLATELETCT  219   MPV  11.0     Recent Labs      01/06/18 1929   SODIUM  139   POTASSIUM  4.0   CHLORIDE  107   CO2  25   GLUCOSE  120*   BUN  29*   CREATININE  1.25   CALCIUM  9.7     Recent Labs      01/06/18 1929   ALTSGPT  6   ASTSGOT  23   ALKPHOSPHAT  95   TBILIRUBIN  0.4   GLUCOSE  120*     Recent Labs      01/06/18 1929   APTT  28.9   INR  1.09     Recent Labs      01/06/18 1929   BNPBTYPENAT  124*         Lab Results   Component Value Date    TROPONINI <0.01 01/06/2018       Imaging  Ct-cspine Without Plus Recons    Result Date: 1/6/2018 1/6/2018 7:16 PM HISTORY/REASON FOR EXAM:  Pain following ground-level fall today. TECHNIQUE/EXAM DESCRIPTION AND NUMBER OF VIEWS:  CT cervical spine without contrast, with reconstructions. Thin-section helical scanning was performed from the skull base through T1.  Sagittal and coronal multiplanar reconstructions were generated from the axial images. Low dose optimization  technique was utilized for this CT exam including automated exposure control and adjustment of the mA and/or kV according to patient size. COMPARISON:  None FINDINGS: Alignment of the vertebral bodies and posterior elements on the sagittal reconstructions is normal. Vertebral body heights are normal. The atlantoaxial joint is intact. Degenerative change is present. There are no fractures. There is disc space narrowing with anterior and posterior osteophytic spurring at C4-5 through C6-7 greatest at C6-7. Visualized soft tissues are unremarkable. The visualized lung apices are clear.     1.  Negative CT scan of the cervical spine.  No fracture or subluxation. 2.  Mild degenerative change involving C1-2 and C4-5 through C6-7.     Ct-head W/o    Result Date: 1/6/2018 1/6/2018 7:16 PM HISTORY/REASON FOR EXAM:  Head pain after ground level fall. TECHNIQUE/EXAM DESCRIPTION AND NUMBER OF VIEWS: CT of the head without contrast. Up to date radiation dose reduction adjustments have been utilized to meet ALARA standards for radiation dose reduction. COMPARISON:  None available FINDINGS: There is no evidence of mass or mass effect. The ventricles and CSF spaces are enlarged. There is mild periventricular chronic small vessel ischemic change present. There is a small area of encephalomalacia in the right temporal lobe anteriorly. There is no intracranial hemorrhage seen. The calvarium is intact. There is a small right supraorbital cephalhematoma. There is subtotal opacification the right maxillary sinus antrum without an air-fluid level. There is mucosal thickening in the ethmoid sinus bilaterally.     1.  No acute intracranial process. 2.  Atrophy and small vessel ischemic change. 3.  Small cephalohematoma in the right supraorbital region. 4.  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    Dx-chest-portable (1 View)    Result Date: 1/6/2018 1/6/2018 8:03 PM HISTORY/REASON FOR EXAM:  Chest and shoulder pain after  fall TECHNIQUE/EXAM DESCRIPTION AND NUMBER OF VIEWS: Single portable view of the chest. COMPARISON: 9/13/2017 FINDINGS: There is borderline cardiac enlargement. The mediastinum and brian are unremarkable. The lungs are well expanded and show no evidence of infiltrate or other acute process. There is no pneumothorax or obvious pleural effusion. The bony thorax is grossly normal.     No acute cardiopulmonary abnormality.    Dx-shoulder 2+ Right    Result Date: 1/6/2018 1/6/2018 8:03 PM HISTORY/REASON FOR EXAM:  Pain/Deformity Following Trauma. TECHNIQUE/EXAM DESCRIPTION AND NUMBER OF VIEWS:  4 views of the RIGHT shoulder. COMPARISON: None FINDINGS: The bony structures are intact, with no acute fracture or dislocation. There is moderate degenerative change involving the AC joint.     No radiographic evidence of acute traumatic injury.     Assessment/Plan     Anticipate that patient will needLess than 2 midnights for management of the discussed medical issues.    This dictation was created using voice recognition software. The accuracy of the dictation is limited to the abilities of the software. I expect there may be some errors of grammar and possibly content.    * Mobitz type 2 second degree heart block   Assessment & Plan    The patient will be admitted to the telemetry floor for close monitoring. He will be on fall precautions and I will request that he is assisted by nursing staff anytime he chooses to get up. I have ordered an echocardiogram. Dr. Burger of electrophysiology was consulted by the emergency room physician and will plan to place a pacemaker in the morning. I will keep the patient nothing by mouth at midnight.        Syncope   Assessment & Plan    Secondary to above, patient will be on gentle fluids and monitor closely on telemetry overnight pending pacemaker implant in the morning. He has an associated right orbital laceration which will be repaired by the emergency room physician.        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          Prophylaxis: Sequential compression devices for DVT prophylaxis, no PPI indicated, stool softeners ordered

## 2018-01-08 ENCOUNTER — APPOINTMENT (OUTPATIENT)
Dept: RADIOLOGY | Facility: MEDICAL CENTER | Age: 83
DRG: 244 | End: 2018-01-08
Attending: INTERNAL MEDICINE
Payer: MEDICARE

## 2018-01-08 VITALS
TEMPERATURE: 97.6 F | HEART RATE: 60 BPM | HEIGHT: 68 IN | DIASTOLIC BLOOD PRESSURE: 69 MMHG | RESPIRATION RATE: 16 BRPM | BODY MASS INDEX: 26.1 KG/M2 | WEIGHT: 172.18 LBS | SYSTOLIC BLOOD PRESSURE: 107 MMHG | OXYGEN SATURATION: 92 %

## 2018-01-08 LAB — EKG IMPRESSION: NORMAL

## 2018-01-08 PROCEDURE — 99239 HOSP IP/OBS DSCHRG MGMT >30: CPT | Performed by: HOSPITALIST

## 2018-01-08 PROCEDURE — 700111 HCHG RX REV CODE 636 W/ 250 OVERRIDE (IP): Performed by: HOSPITALIST

## 2018-01-08 PROCEDURE — G8979 MOBILITY GOAL STATUS: HCPCS | Mod: CI

## 2018-01-08 PROCEDURE — 93005 ELECTROCARDIOGRAM TRACING: CPT | Performed by: INTERNAL MEDICINE

## 2018-01-08 PROCEDURE — 71045 X-RAY EXAM CHEST 1 VIEW: CPT

## 2018-01-08 PROCEDURE — 93010 ELECTROCARDIOGRAM REPORT: CPT | Performed by: INTERNAL MEDICINE

## 2018-01-08 PROCEDURE — 97162 PT EVAL MOD COMPLEX 30 MIN: CPT

## 2018-01-08 PROCEDURE — 700102 HCHG RX REV CODE 250 W/ 637 OVERRIDE(OP): Performed by: HOSPITALIST

## 2018-01-08 PROCEDURE — 3E0234Z INTRODUCTION OF SERUM, TOXOID AND VACCINE INTO MUSCLE, PERCUTANEOUS APPROACH: ICD-10-PCS | Performed by: HOSPITALIST

## 2018-01-08 PROCEDURE — 90670 PCV13 VACCINE IM: CPT | Performed by: HOSPITALIST

## 2018-01-08 PROCEDURE — G8978 MOBILITY CURRENT STATUS: HCPCS | Mod: CI

## 2018-01-08 PROCEDURE — A9270 NON-COVERED ITEM OR SERVICE: HCPCS | Performed by: HOSPITALIST

## 2018-01-08 PROCEDURE — 90471 IMMUNIZATION ADMIN: CPT

## 2018-01-08 PROCEDURE — G8980 MOBILITY D/C STATUS: HCPCS | Mod: CI

## 2018-01-08 RX ADMIN — FINASTERIDE 5 MG: 5 TABLET, FILM COATED ORAL at 09:05

## 2018-01-08 RX ADMIN — PNEUMOCOCCAL 13-VALENT CONJUGATE VACCINE 0.5 ML: 2.2; 2.2; 2.2; 2.2; 2.2; 4.4; 2.2; 2.2; 2.2; 2.2; 2.2; 2.2; 2.2 INJECTION, SUSPENSION INTRAMUSCULAR at 09:05

## 2018-01-08 RX ADMIN — CARBIDOPA AND LEVODOPA 1 TABLET: 25; 100 TABLET ORAL at 14:15

## 2018-01-08 RX ADMIN — PRAMIPEXOLE DIHYDROCHLORIDE 0.75 MG: 0.25 TABLET ORAL at 09:04

## 2018-01-08 RX ADMIN — STANDARDIZED SENNA CONCENTRATE AND DOCUSATE SODIUM 2 TABLET: 8.6; 5 TABLET, FILM COATED ORAL at 09:05

## 2018-01-08 RX ADMIN — GEMFIBROZIL 600 MG: 600 TABLET ORAL at 06:06

## 2018-01-08 RX ADMIN — PRAMIPEXOLE DIHYDROCHLORIDE 0.75 MG: 0.25 TABLET ORAL at 14:15

## 2018-01-08 RX ADMIN — CARBIDOPA AND LEVODOPA 1 TABLET: 25; 100 TABLET ORAL at 09:05

## 2018-01-08 RX ADMIN — DONEPEZIL HYDROCHLORIDE 10 MG: 5 TABLET, FILM COATED ORAL at 09:05

## 2018-01-08 ASSESSMENT — COGNITIVE AND FUNCTIONAL STATUS - GENERAL
MOBILITY SCORE: 23
SUGGESTED CMS G CODE MODIFIER MOBILITY: CI
CLIMB 3 TO 5 STEPS WITH RAILING: A LITTLE

## 2018-01-08 ASSESSMENT — ENCOUNTER SYMPTOMS
SPUTUM PRODUCTION: 0
DEPRESSION: 0
ABDOMINAL PAIN: 0
HEMOPTYSIS: 0
SHORTNESS OF BREATH: 0
PALPITATIONS: 0
COUGH: 0
INSOMNIA: 0
VOMITING: 0
DIARRHEA: 0
WEAKNESS: 0
SPEECH CHANGE: 0
CONSTIPATION: 0
BLOOD IN STOOL: 0
HEADACHES: 0
WHEEZING: 0
SINUS PAIN: 0
NERVOUS/ANXIOUS: 0
DIZZINESS: 0
ORTHOPNEA: 0
NAUSEA: 0
PND: 0
FEVER: 0
BRUISES/BLEEDS EASILY: 0
SENSORY CHANGE: 0
CHILLS: 0
BLURRED VISION: 1
FOCAL WEAKNESS: 0
DIAPHORESIS: 0

## 2018-01-08 ASSESSMENT — PAIN SCALES - GENERAL
PAINLEVEL_OUTOF10: 0
PAINLEVEL_OUTOF10: 0

## 2018-01-08 ASSESSMENT — GAIT ASSESSMENTS
DISTANCE (FEET): 200
GAIT LEVEL OF ASSIST: STAND BY ASSIST

## 2018-01-08 NOTE — DISCHARGE INSTRUCTIONS
Discharge Instructions    Discharged to home by car with relative. Discharged via wheelchair, hospital escort: Yes.  Special equipment needed: Not Applicable    Be sure to schedule a follow-up appointment with your primary care doctor or any specialists as instructed.     Discharge Plan:   Diet Plan: Discussed  Activity Level: Discussed  Confirmed Follow up Appointment: Appointment Scheduled  Confirmed Symptoms Management: Discussed  Medication Reconciliation Updated: Yes  Pneumococcal Vaccine Given - only chart on this line when given: Given (See MAR)  Influenza Vaccine Indication: Not indicated: Previously immunized this influenza season and > 8 years of age    I understand that a diet low in cholesterol, fat, and sodium is recommended for good health. Unless I have been given specific instructions below for another diet, I accept this instruction as my diet prescription.     Other diet:     Special Instructions:  CardiacPacemaker Discharge Instructions/[facility]     1.  No showers for one week; may take sponge bath.  Keep dressing dry & in place until seen at for you follow up visit at the cardiology office.   2.  No lifting over 10 lbs for six weeks.   3.  Do not raise affected arm above shoulder level or behind head for six weeks.   4.  Avoid excessive pushing, pulling, or twisting for six weeks.   5.  No driving for the first week.   6.  Call our office ([phone]) if you notice any increased swelling, redness, warmth, or drainage at the implant site.   7.  Call our office if you develop fever > 101F or uncontrolled pain.   8.  No MRI's for 6 weeks if you have a MRI compatible device.   9.  No dental work or cleanings for 3 months.   10.  May remove arm sling after one day, but please wear if you have trouble remembering to keep your arm down.   11. Do not place cell phones or mobile devices directly over implanted device.   12. Your follow-up appointments will be done at approximately 1 week, 6  weeks, then every 3-4 months.    · Is patient discharged on Warfarin / Coumadin?   No     · Is patient Post Blood Transfusion?  No    Second-Degree Atrioventricular Block  Second-degree atrioventricular (AV) block is a type of heart block.  About Heart Block  Heart block is a problem with the system that controls how often the heart beats (heart rate). If you have heart block, the electrical signals that regulate your heart rate are slowed or interrupted.  Normal Heart Action  The heart has two upper chambers (atria) and two lower chambers (ventricles). They work together to pump blood to the body. The heartbeat starts in an upper area of the right atrium (sinoatrial node, or SA node). This is the heart's natural pacemaker. The SA node sends electrical signals that pass through another node (atrioventricular node, or AV node), which is located between the atria and ventricles. Next, the signals travel through the ventricles on conduction pathways. As the signals pass down these pathways, the ventricles contract and send blood out to the body.  About Second-Degree AV Block  Second-degree heart block is a serious condition. If you have second-degree AV block, the signals are slowed or interrupted while they travel through the heart. You may start missing beats. Eventually, you may develop complete heart block and may need an artificial pacemaker.  There are two types of second-degree AV block:  · Mobitz type 1 block usually does not cause symptoms. It rarely requires treatment.  · Mobitz type 2 block is more serious. This type may:  ¨ Cause more missed heartbeats.  ¨ Make the missed beats very irregular.  ¨ Lead to complete heart block. This is a dangerous condition that requires emergency treatment.  CAUSES  In some cases, a person is born with heart block. More often, the condition develops over time. Second-degree heart block may be caused by:  · Any condition that damages the heart's conducting system.  · Some  medicines that slow down the heart rate.  RISK FACTORS  The risk for this condition increases with age. It is also more likely to develop in people who have any of the following conditions:  · A heart attack in the past.  · Heart failure.  · Coronary heart disease.  · Inflammation of heart muscle (myocarditis).  · Disease of heart muscle (cardiomyopathy).  · Infection of the heart valves (endocarditis).  · Infections or diseases that affect the heart. These include:  ¨ Lyme disease.  ¨ Sarcoidosis.  ¨ Hemochromatosis.  ¨ Rheumatic fever.  SYMPTOMS  Symptoms depend on the type of second-degree AV block that you have. Type 1 block may not cause any symptoms. Symptoms of type 2 block include:   · Fatigue.  · Shortness of breath.  · Dizziness or light-headedness.  · Fainting.  · Chest pain.  DIAGNOSIS  This condition may be diagnosed during a routine physical exam. Your health care provider may suspect a heart block if you have a slow pulse or heartbeat. You may have tests to confirm the diagnosis and to rule out other conditions. These tests may include:  · An electrocardiogram (ECG) to check for problems with the electrical activity in your heart.  · Wearing a portable ECG device for a few days (Holter monitor) or a few weeks (event monitor). This is done to monitor your heart rate over time.  · An electrophysiology (EP) study to test the electrical pathway of your heart. A specialist will place long, thin tubes (catheters) in your heart. The catheters are used to study your heart and record the electrical signals from your heart.  TREATMENT  Treatment for second-degree AV block depends on the type of heart block that you have and how bad it is.  · Type 1: You may not need treatment.  · Type 2: You may need to get a permanent pacemaker.  You may also need treatment for another condition that is causing heart block. It may also be necessary to change or stop taking any heart medicines that could cause heart  block.  HOME CARE INSTRUCTIONS  · Take over-the-counter and prescription medicines only as told by your health care provider.  · Work with your health care providers to control all of your risks for heart disease. This may include following these instructions:  ¨ Get regular exercise. Ask your health care provider what type of exercise is safe for you.  ¨ Eat a heart-healthy diet. Your health care provider or dietitian can help you make healthy choices.  ¨ Maintain a healthy weight.  ¨ Do not use any tobacco products, including cigarettes, chewing tobacco, or e-cigarettes. If you need help quitting, ask your health care provider.  ¨ Limit alcohol intake to no more than 1 drink per day for nonpregnant women and 2 drinks per day for men. One drink equals 12 oz of beer, 5 oz of wine, or 1½ oz of hard liquor.  · Keep all follow-up visits as told by your health care provider. This is important.  SEEK MEDICAL CARE IF:  · Your symptoms change or get worse.  · You feel as though your heart is skipping beats.  · You feel more tired than normal.  · You have swelling in your lower legs.  SEEK IMMEDIATE MEDICAL CARE IF:  · You have chest pain, especially if the pain:  ¨ Feels like crushing or pressure.  ¨ Spreads to your arms, back, neck, or jaw.  · You feel short of breath.  · You feel light-headed or weak.  · You faint.     This information is not intended to replace advice given to you by your health care provider. Make sure you discuss any questions you have with your health care provider.     Document Released: 11/30/2009 Document Revised: 05/03/2016 Document Reviewed: 11/18/2015  [employer] Interactive Patient Education ©2016 [employer]      Depression / Suicide Risk    As you are discharged from this [facility] facility, it is important to learn how to keep safe from harming yourself.    Recognize the warning signs:  · Abrupt changes in personality, positive or negative- including increase in energy   · Giving away  possessions  · Change in eating patterns- significant weight changes-  positive or negative  · Change in sleeping patterns- unable to sleep or sleeping all the time   · Unwillingness or inability to communicate  · Depression  · Unusual sadness, discouragement and loneliness  · Talk of wanting to die  · Neglect of personal appearance   · Rebelliousness- reckless behavior  · Withdrawal from people/activities they love  · Confusion- inability to concentrate     If you or a loved one observes any of these behaviors or has concerns about self-harm, here's what you can do:  · Talk about it- your feelings and reasons for harming yourself  · Remove any means that you might use to hurt yourself (examples: pills, rope, extension cords, firearm)  · Get professional help from the community (Mental Health, Substance Abuse, psychological counseling)  · Do not be alone:Call your Safe Contact- someone whom you trust who will be there for you.  · Call your local CRISIS HOTLINE 220-7581 or 310-746-6166  · Call your local Children's Mobile Crisis Response Team [place] (562) 175-5057 or www.[employer]  · Call the toll free National Suicide Prevention Hotlines   · National Suicide Prevention Lifeline 478-970-UWFX (3330)  · National Hope Line Network 800-SUICIDE (844-5321)

## 2018-01-08 NOTE — PROGRESS NOTES
"Pt not using his call light for assistance, yells out.  States \"I don't have that thingy to call you\", as patient is holding it in his right hand.  Pt has urinary urgency, intermittently incontinent.  Bed alarm on, upper side rails up, hourly rounding in place.    "

## 2018-01-08 NOTE — PROGRESS NOTES
Cardiology Progress Note               Author: Georgina Pratt Date & Time created: 1/8/2018  12:32 PM     Interval History:    Patient seen on EPS rounds.    S/p dual chamber PPM placement on 1/7/18 by Dr. Burger.    Device site uncomplicated.  Cxr negative for pneumothorax, leads appear appropriately positioned. 100% paced on a.m. EKG with some inhibition.  VSS.  -127, HR 60's overnight.  Afebrile. No pain.  Not requiring O2.  Device interrogation this morning showed data that is stable from implant & is as follows:  RA: 1.7 mV, threshold 0.8 V, impedance 520 ohms  RV: 11 mV, threshold 0.5 V, impedance 693 ohms  Post-procedure PPM education was reviewed with the patient & his wife at the bedside.  All questions were answered.    Pacemaker Discharge Instructions/[facility]  1.  No showers for one week; may take sponge bath.  Keep dressing dry & in place until seen at for you follow up visit at the cardiology office.  2.  No lifting over 10 lbs for six weeks.  3.  Do not raise affected arm above shoulder level or behind head for six weeks.  4.  Avoid excessive pushing, pulling, or twisting for six weeks.  5.  No driving for the first week.  6.  Call our office ([phone]) if you notice any increased swelling, redness, warmth, or drainage at the implant site.  7.  Call our office if you develop fever > 101F or uncontrolled pain.  8.  No MRI's for 6 weeks if you have a MRI compatible device.  9.  No dental work or cleanings for 3 months.  10.  May remove arm sling after one day, but please wear if you have trouble remembering to keep your arm down.  11. Do not place cell phones or mobile devices directly over implanted device.   12. Your follow-up appointments will be done at approximately 1 week, 6 weeks, then every 3-4 months.    Chief Complaint:  Syncope, pauses    Review of Systems   Constitutional: Negative for chills, diaphoresis, fever and malaise/fatigue.   HENT: Positive for hearing loss.  Negative for congestion, nosebleeds and sinus pain.    Eyes: Positive for blurred vision.        Wears glasses     Respiratory: Negative for cough, hemoptysis, sputum production, shortness of breath and wheezing.    Cardiovascular: Negative for chest pain, palpitations, orthopnea, leg swelling and PND.   Gastrointestinal: Negative for abdominal pain, blood in stool, constipation, diarrhea, melena, nausea and vomiting.   Genitourinary: Negative for dysuria, frequency, hematuria and urgency.   Neurological: Negative for dizziness, sensory change, speech change, focal weakness, weakness and headaches.   Endo/Heme/Allergies: Does not bruise/bleed easily.   Psychiatric/Behavioral: Negative for depression. The patient is not nervous/anxious and does not have insomnia.    All other systems reviewed and are negative.    Physical Exam   Constitutional: He appears well-developed and well-nourished. No distress.   HENT:   Head: Normocephalic.   Right Ear: External ear normal.   Left Ear: External ear normal.   Nose: Nose normal.   Douglas  Right superior eye laceration, scabbed, no drainage   Eyes: EOM are normal. Pupils are equal, round, and reactive to light. Right eye exhibits no discharge. Left eye exhibits no discharge. Right conjunctiva is injected. Left conjunctiva is injected. No scleral icterus.   Neck: Normal range of motion. Neck supple. No JVD present.   Cardiovascular: Normal rate, regular rhythm, normal heart sounds and intact distal pulses.  Exam reveals no gallop and no friction rub.    No murmur heard.  Paced with rates in the low 60's.   Pulmonary/Chest: Effort normal and breath sounds normal. No stridor. No respiratory distress. He has no wheezes. He has no rales.   Left upper chest PPM site has a gauze & tegaderm dressing in place that is clean, dry, & intact.  No edema, ecchymosis, or erythema.   Abdominal: Soft. Bowel sounds are normal. He exhibits no distension. There is no tenderness. There is no rebound  and no guarding.   Musculoskeletal: Normal range of motion. He exhibits no edema.   Neurological: He is alert.   Skin: Skin is warm and dry. No rash noted. He is not diaphoretic. No erythema. No pallor.   Psychiatric: He has a normal mood and affect. Judgment and thought content normal. His speech is delayed and slurred. He is slowed. Cognition and memory are impaired.   Nursing note and vitals reviewed.    Hemodynamics:  Temp (24hrs), Av.5 °C (97.7 °F), Min:36.3 °C (97.4 °F), Max:37 °C (98.6 °F)  Temperature: 36.8 °C (98.3 °F)  Pulse  Av.5  Min: 52  Max: 73  Blood Pressure : 127/71       Respiratory:  Respiration: 16, Pulse Oximetry: 92 %  RUL Breath Sounds: Clear, RML Breath Sounds: Clear, RLL Breath Sounds: Diminished, EMELYN Breath Sounds: Clear, LLL Breath Sounds: Diminished  Fluids:  Date 18 0700 - 18 0659   Shift 7687-4376 8799-6522 5070-6353 24 Hour Total   I  N  T  A  K  E   Shift Total       O  U  T  P  U  T   Urine 300   300    Shift Total 300   300   Weight (kg) 78.1 78.1 78.1 78.1   Weight: 78.1 kg (172 lb 2.9 oz)     GI/Nutrition:  Orders Placed This Encounter   Procedures   • Diet Order     Standing Status:   Standing     Number of Occurrences:   1     Order Specific Question:   Diet:     Answer:   Cardiac [6]     Lab Results:  Recent Labs      18   WBC  4.7*  8.1   RBC  4.66*  4.03*   HEMOGLOBIN  14.9  12.7*   HEMATOCRIT  41.6*  36.5*   MCV  89.3  90.6   MCH  32.0  31.5   MCHC  35.8*  34.8   RDW  42.1  42.3   PLATELETCT  219  212   MPV  11.0  11.6     Recent Labs      18   SODIUM  139  142   POTASSIUM  4.0  4.3   CHLORIDE  107  112   CO2  25  25   GLUCOSE  120*  118*   BUN  29*  28*   CREATININE  1.25  1.16   CALCIUM  9.7  9.3     Recent Labs      18   APTT  28.9   INR  1.09     Recent Labs      18   BNPBTYPENAT  124*     Recent Labs      18   TROPONINI  <0.01   BNPBTYPENAT  124*      Medical Decision Making, by Problem:  Active Hospital Problems    Diagnosis   • *Mobitz type 2 second degree heart block [I44.1]   • Syncope [R55]   • Parkinson disease (CMS-HCC) [G20]     Plan:    Successful, uncomplicated PPM placement.  Follow up device check appt made.  Black shoulder immobilizer ordered.    EP signing off.  Thank you for this consultation.  We will continue to follow closely as outpatient.  Case discussed with hospitalist Dr. Villarreal.  Patient to be discharged today pending PT evaluation.      Reviewed items::  EKG reviewed, Radiology images reviewed, Labs reviewed and Medications reviewed

## 2018-01-08 NOTE — PROGRESS NOTES
Shoulder immobilizer sling has been applied and fitted to pt's L UE. Pt is tolerating well at this time.

## 2018-01-08 NOTE — PROGRESS NOTES
Pt. Given discharge instructions, discussed diet, activity, follow up appointments, symptoms & management, and pacemaker discharge instructions. Packet sent with patient, IV d/c'd, tele box off and all questions answered. RN transported via wheelchair to [facility] where wife was to pick him up.

## 2018-01-09 ENCOUNTER — PATIENT OUTREACH (OUTPATIENT)
Dept: HEALTH INFORMATION MANAGEMENT | Facility: OTHER | Age: 83
End: 2018-01-09

## 2018-01-09 NOTE — DISCHARGE SUMMARY
Hospital Medicine Discharge Note     Admit Date:  1/6/2018       Discharge Date:   1/8/2018    Attending Physician:  Po Villarreal M.D.      Diagnoses (includes active and resolved):     Principal Problem:    Mobitz type 2 second degree heart block POA: Unknown, pos pacemaker.  Active Problems:    Syncope POA: Unknown, cardiogenic.    Right eye brown laceration from fall, sutured.     Parkinson disease ([facility]) POA: Yes    Hospital Summary (Brief Narrative):       82 y.o. male hx of Parkinsons dz admitted 1/6/2018 with GLF w syncope.  Sustained Right eye brown laceration from fall that was  Sutured.  He was Dx Mobitz type II Hb.    Following admit , he had persistent  2nd degree block hr 50-70s.   He underwent dual chamber pacemaker implantation on 1-7-18 per Dr. Burger. He was 100% paced following procedure.  Shoulder immobilizer sling applied.  Stabilized, patient was discharged home with follow up with cardiology outpatient recommended.      Consultants:        Dr. Burger, Cardiology , EPS    Imaging/ Testing:      DX-CHEST-PORTABLE (1 VIEW)   Final Result      Stable chest appearance compared with 1/7.      ECHOCARDIOGRAM COMP W/O CONT   Final Result      DX-CHEST-PORTABLE (1 VIEW)   Final Result      New left bipolar transvenous pacing device in place. No pneumothorax identified.      DX-SHOULDER 2+ RIGHT   Final Result      No radiographic evidence of acute traumatic injury.      DX-CHEST-PORTABLE (1 VIEW)   Final Result      No acute cardiopulmonary abnormality.      CT-CSPINE WITHOUT PLUS RECONS   Final Result         1.  Negative CT scan of the cervical spine.  No fracture or subluxation.      2.  Mild degenerative change involving C1-2 and C4-5 through C6-7.         CT-HEAD W/O   Final Result      1.  No acute intracranial process.      2.  Atrophy and small vessel ischemic change.      3.  Small cephalohematoma in the right supraorbital region.      4.  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            Procedures:          1-7-18    PROCEDURE PERFORMED: dual chamber pacemaker implantation     : Trever Burger MD     ASSISTANT: none     ANESTHESIA: Local with moderate sedation     EBL: 30 cc     SPECIMENS: None     INDICATION: syncope with heart block     PRE PROCEDURE ECG: sinus rhythm with first degree av block     POST PROCEDURE ECG: AV dual pacing     COMPLICATIONS:  None apparent     Discharge Medications:             Medication List      CONTINUE taking these medications      Instructions   aspirin 81 MG tablet   Take 81 mg by mouth every evening.  Dose:  81 mg     carbidopa-levodopa  MG Tabs  Commonly known as:  SINEMET   Take 1 Tab by mouth 4 times a day.  Dose:  1 Tab     donepezil 10 MG tablet  Commonly known as:  ARICEPT   Take 1 Tab by mouth every day.  Dose:  10 mg     finasteride 5 MG Tabs  Commonly known as:  PROSCAR   Take 1 Tab by mouth every day.  Dose:  5 mg     gemfibrozil 600 MG Tabs  Commonly known as:  LOPID   Take 1 Tab by mouth 2 times a day.  Dose:  600 mg     Pramipexole Dihydrochloride 0.75 MG Tabs   Take 1 Tab by mouth 3 times a day.  Dose:  1 Tab     therapeutic multivitamin-minerals Tabs   Take 1 Tab by mouth every morning.  Dose:  1 Tab               Diet:       DIET ORDERS (Through next 24h)    None            Activity:   As tolerated.      Code status:   Full code    Primary Care Provider:    Maurice Keene M.D.    Follow up appointment details :      .  Maurice Keene M.D.  [address]  [facility] [address]  [phone]          Trever Burger M.D.  [address]  [address]  [facility] [address]  [phone]    In 1 week      Future Appointments  Date Time Provider Department Center   1/19/2018 8:15 AM PACER CHECK-[facility] B 2 [facility] None   3/13/2018 10:00 AM Gael Oden M.D. [facility] None           Time spent on discharge day patient visit: 40 minutes    #################################################

## 2018-01-10 ENCOUNTER — OFFICE VISIT (OUTPATIENT)
Dept: MEDICAL GROUP | Facility: PHYSICIAN GROUP | Age: 83
End: 2018-01-10
Payer: MEDICARE

## 2018-01-10 VITALS
RESPIRATION RATE: 16 BRPM | OXYGEN SATURATION: 96 % | BODY MASS INDEX: 26.07 KG/M2 | HEART RATE: 64 BPM | WEIGHT: 172 LBS | TEMPERATURE: 97.7 F | HEIGHT: 68 IN

## 2018-01-10 DIAGNOSIS — G20.A1 PARKINSON DISEASE: ICD-10-CM

## 2018-01-10 DIAGNOSIS — I44.1 MOBITZ TYPE 2 SECOND DEGREE HEART BLOCK: ICD-10-CM

## 2018-01-10 PROCEDURE — 99213 OFFICE O/P EST LOW 20 MIN: CPT | Performed by: INTERNAL MEDICINE

## 2018-01-10 RX ORDER — PRAMIPEXOLE DIHYDROCHLORIDE 0.75 MG/1
1 TABLET ORAL 3 TIMES DAILY
Qty: 90 TAB | Refills: 2 | Status: SHIPPED | OUTPATIENT
Start: 2018-01-10 | End: 2018-01-12 | Stop reason: SDUPTHER

## 2018-01-10 RX ORDER — DONEPEZIL HYDROCHLORIDE 10 MG/1
10 TABLET, FILM COATED ORAL DAILY
Qty: 30 TAB | Refills: 2 | Status: SHIPPED | OUTPATIENT
Start: 2018-01-10 | End: 2018-01-12 | Stop reason: SDUPTHER

## 2018-01-10 NOTE — ASSESSMENT & PLAN NOTE
He is due for refills on his medications and doesn't have follow up with neurology until March 2018. Urgent referral was placed to try to get sooner appointment, specifically with a movement specialist, which is being processed.

## 2018-01-10 NOTE — ASSESSMENT & PLAN NOTE
Patient had syncopal event 1/6/18 in [facility] given his Mobitz type 2 heart block which was diagnosed during hospitalization. Also sustained right eyebrow laceration which required sutures. He was discharged 1/8/18 with dual chamber pacemaker, placed by Dr. Burger, and has pacemaker check coming up 1/19/18. He has no pain at his pacemaker site and has been compliant with his sling. Still has resolving facial bruising.

## 2018-01-10 NOTE — PROGRESS NOTES
PRIMARY CARE CLINIC FOLLOW UP VISIT  Chief Complaint   Patient presents with   • Hospital Follow-up     History of Present Illness     Shukri is an established patient of mine here with his wife for post hospitalization follow up     Mobitz type 2 second degree heart block  Patient had syncopal event 1/6/18 in [facility] given his Mobitz type 2 heart block which was diagnosed during hospitalization. Also sustained right eyebrow laceration which required sutures. He was discharged 1/8/18 with dual chamber pacemaker, placed by Dr. Burger, and has pacemaker check coming up 1/19/18. He has no pain at his pacemaker site and has been compliant with his sling. Still has resolving facial bruising.     Parkinson disease (CMS-[facility])  He is due for refills on his medications and doesn't have follow up with neurology until March 2018. Urgent referral was placed to try to get sooner appointment, specifically with a movement specialist, which is being processed.     Current Outpatient Prescriptions   Medication Sig Dispense Refill   • donepezil (ARICEPT) 10 MG tablet Take 1 Tab by mouth every day. 30 Tab 2   • Pramipexole Dihydrochloride 0.75 MG Tab Take 1 Tab by mouth 3 times a day. 90 Tab 2   • carbidopa-levodopa (SINEMET)  MG Tab Take 1 Tab by mouth 4 times a day. 120 Tab 2   • finasteride (PROSCAR) 5 MG Tab Take 1 Tab by mouth every day. 90 Tab 1   • gemfibrozil (LOPID) 600 MG Tab Take 1 Tab by mouth 2 times a day. 60 Tab 2   • therapeutic multivitamin-minerals (THERAGRAN-M) Tab Take 1 Tab by mouth every morning.     • aspirin 81 MG tablet Take 81 mg by mouth every evening.       No current facility-administered medications for this visit.      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 No pertinent surgical history.  Social History   Substance Use Topics   • Smoking status: Former Smoker     Packs/day: 1.00     Years: 30.00     Quit date: 1990   • Smokeless tobacco: Former User     Quit date: 1993   • Alcohol use Yes      Comment: only on special occassions      Social History     Social History Narrative    Retired from [employer] (30 years), high school (13 years)     Family History   Problem Relation Age of Onset   • Diabetes Mother      Family Status   Relation Status   • Mother    • Father      Allergies: Patient has no known allergies.    ROS  As per HPI above. All other systems reviewed and negative.        Objective   Pulse 64, temperature 36.5 °C (97.7 °F), resp. rate 16, height 1.727 m (5' 8\"), weight 78 kg (172 lb), SpO2 96 %. Body mass index is 26.15 kg/m².    General: alert and oriented, pleasant, cooperative  HEENT: right eyebrow laceration with clean, dry, intact sutures. Bruising below right eye   Cardiovascular: regular rate and rhythm, normal S1/S2  Skin: pacemaker dressing clean, dry, intact with no pain to palpation or surrounding induration       Assessment and Plan   The following treatment plan was discussed     1. Parkinson disease (CMS-HCC)  Provided refills and wife will follow up on urgent referral to try to be seen sooner than March.   - donepezil (ARICEPT) 10 MG tablet; Take 1 Tab by mouth every day.  Dispense: 30 Tab; Refill: 2  - Pramipexole Dihydrochloride 0.75 MG Tab; Take 1 Tab by mouth 3 times a day.  Dispense: 90 Tab; Refill: 2  - carbidopa-levodopa (SINEMET)  MG Tab; Take 1 Tab by mouth 4 times a day.  Dispense: 120 Tab; Refill: 2    2. Mobitz type 2 second degree heart block  Follows with Dr. Burger, had dual chamber " pacemaker placed during hospitalization after syncopal event 1/6/18. Pacemaker dressing clean, dry, intact with sling in place and will follow up on 1/19/18 for pacemaker check.       Healthcare maintenance     Health Maintenance Due   Topic Date Due   • Annual Wellness Visit  1935       Return in about 3 months (around 4/10/2018) for AWV.    Maurice Keene MD  Internal Medicine  [facility]

## 2018-01-12 DIAGNOSIS — G20.A1 PARKINSON DISEASE: ICD-10-CM

## 2018-01-12 RX ORDER — PRAMIPEXOLE DIHYDROCHLORIDE 0.75 MG/1
1 TABLET ORAL 3 TIMES DAILY
Qty: 90 TAB | Refills: 2 | Status: SHIPPED | OUTPATIENT
Start: 2018-01-12 | End: 2018-03-13 | Stop reason: SDUPTHER

## 2018-01-12 RX ORDER — DONEPEZIL HYDROCHLORIDE 10 MG/1
10 TABLET, FILM COATED ORAL DAILY
Qty: 30 TAB | Refills: 2 | Status: SHIPPED | OUTPATIENT
Start: 2018-01-12 | End: 2018-03-13

## 2018-01-12 RX ORDER — DONEPEZIL HYDROCHLORIDE 10 MG/1
10 TABLET, FILM COATED ORAL DAILY
Qty: 30 TAB | Refills: 2 | OUTPATIENT
Start: 2018-01-12

## 2018-01-12 RX ORDER — PRAMIPEXOLE DIHYDROCHLORIDE 0.75 MG/1
1 TABLET ORAL 3 TIMES DAILY
Qty: 90 TAB | Refills: 2 | OUTPATIENT
Start: 2018-01-12

## 2018-01-12 NOTE — TELEPHONE ENCOUNTER
Patient requests change in pharmacy.     [id number] - [place], [place] - [address] [address]  Phone: [phone] Fax: [phone]

## 2018-01-19 ENCOUNTER — NON-PROVIDER VISIT (OUTPATIENT)
Dept: CARDIOLOGY | Facility: MEDICAL CENTER | Age: 83
End: 2018-01-19
Payer: MEDICARE

## 2018-01-19 DIAGNOSIS — Z95.0 CARDIAC PACEMAKER IN SITU: ICD-10-CM

## 2018-01-19 DIAGNOSIS — I44.1 MOBITZ TYPE 2 SECOND DEGREE HEART BLOCK: ICD-10-CM

## 2018-01-19 PROCEDURE — 93280 PM DEVICE PROGR EVAL DUAL: CPT | Performed by: INTERNAL MEDICINE

## 2018-01-19 NOTE — PROGRESS NOTES
Wound site is healing well.  Patient advised to watch for increased redness, swelling, oozing or fever--verbalized understanding.  Patient to return in 5-6 weeks for final threshold testing.  Patient requesting to become established with cardiologist .

## 2018-02-04 LAB — EKG IMPRESSION: NORMAL

## 2018-02-08 ENCOUNTER — TELEPHONE (OUTPATIENT)
Dept: CARDIOLOGY | Facility: MEDICAL CENTER | Age: 83
End: 2018-02-08

## 2018-02-26 DIAGNOSIS — E78.5 DYSLIPIDEMIA: ICD-10-CM

## 2018-02-27 ENCOUNTER — OFFICE VISIT (OUTPATIENT)
Dept: CARDIOLOGY | Facility: MEDICAL CENTER | Age: 83
End: 2018-02-27
Payer: MEDICARE

## 2018-02-27 ENCOUNTER — NON-PROVIDER VISIT (OUTPATIENT)
Dept: CARDIOLOGY | Facility: MEDICAL CENTER | Age: 83
End: 2018-02-27
Payer: MEDICARE

## 2018-02-27 VITALS
HEART RATE: 64 BPM | SYSTOLIC BLOOD PRESSURE: 110 MMHG | BODY MASS INDEX: 26.37 KG/M2 | HEIGHT: 68 IN | WEIGHT: 174 LBS | DIASTOLIC BLOOD PRESSURE: 70 MMHG | OXYGEN SATURATION: 93 %

## 2018-02-27 DIAGNOSIS — I44.1 MOBITZ TYPE 2 SECOND DEGREE HEART BLOCK: ICD-10-CM

## 2018-02-27 DIAGNOSIS — Z95.0 CARDIAC PACEMAKER IN SITU: ICD-10-CM

## 2018-02-27 PROBLEM — R55 SYNCOPE: Status: RESOLVED | Noted: 2018-01-06 | Resolved: 2018-02-27

## 2018-02-27 PROBLEM — R74.8 ELEVATED ALKALINE PHOSPHATASE LEVEL: Status: RESOLVED | Noted: 2017-11-13 | Resolved: 2018-02-27

## 2018-02-27 PROCEDURE — 99214 OFFICE O/P EST MOD 30 MIN: CPT | Performed by: INTERNAL MEDICINE

## 2018-02-27 PROCEDURE — 93280 PM DEVICE PROGR EVAL DUAL: CPT | Performed by: INTERNAL MEDICINE

## 2018-02-27 RX ORDER — GEMFIBROZIL 600 MG/1
600 TABLET, FILM COATED ORAL 2 TIMES DAILY
Qty: 60 TAB | Refills: 2 | Status: SHIPPED | OUTPATIENT
Start: 2018-02-27 | End: 2018-05-24 | Stop reason: SDUPTHER

## 2018-02-27 NOTE — TELEPHONE ENCOUNTER
See MA's notes below, pt has met protocol, OV 01/2018    Lab Results  Component Value Date/Time   CHOLSTRLTOT 153 09/26/2017 0736   TRIGLYCERIDE 111 09/26/2017 0736   HDL 39 (A) 09/26/2017 0736   LDL 92 09/26/2017 0736

## 2018-02-27 NOTE — PROGRESS NOTES
Subjective:   Shukri Morrow is a 82 y.o. male who presents today     In follow-up  He is new to me and following up after his pacemaker placement for syncope and a fall last month  In with his wife    She worries about him, he is not sleeping well nor has he in the past. Sometimes snores. 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 No pertinent surgical history.  Family History   Problem Relation Age of Onset   • Diabetes Mother      History   Smoking Status   • Former Smoker   • Packs/day: 1.00   • Years: 30.00   • Quit date: 1/1/1990   Smokeless Tobacco   • Former User   • Quit date: 1/1/1993     No Known Allergies  Outpatient Encounter Prescriptions as of 2/27/2018   Medication Sig Dispense Refill   • donepezil (ARICEPT) 10 MG tablet Take 1 Tab by mouth every day. 30 Tab 2   • Pramipexole Dihydrochloride 0.75 MG Tab Take 1 Tab by mouth 3 times a day. 90 Tab 2   • carbidopa-levodopa (SINEMET)  MG Tab Take 1 Tab by mouth 4 times a day. 120 Tab 2   • finasteride (PROSCAR) 5 MG Tab Take 1 Tab by mouth every day. 90 Tab 1   • gemfibrozil (LOPID) 600 MG Tab Take 1 Tab by mouth 2 times a day. 60 Tab 2   • therapeutic multivitamin-minerals (THERAGRAN-M) Tab Take 1 Tab by mouth every morning.     • aspirin 81 MG tablet Take 81 mg by mouth every evening.       No facility-administered encounter medications on file as of 2/27/2018.      Review of Systems   Unable to  "perform ROS: Medical condition        Objective:   /70   Pulse 64   Ht 1.727 m (5' 8\")   Wt 78.9 kg (174 lb)   SpO2 93%   BMI 26.46 kg/m²     Physical Exam   Constitutional: He is oriented to person, place, and time.   Thin and elderly, pin rolling tremor at rest right more than left   HENT:   Head: Normocephalic and atraumatic.   Eyes: EOM are normal. Pupils are equal, round, and reactive to light.   Neck: Neck supple. No JVD present. No thyromegaly present.   Cardiovascular: Normal rate and regular rhythm.  Exam reveals no gallop.    No murmur heard.  Pulmonary/Chest: Breath sounds normal. No respiratory distress. He exhibits no tenderness.   Pacemaker well-seated and well-healed in the left chest   Abdominal: Bowel sounds are normal. He exhibits no distension.   Musculoskeletal: He exhibits no edema or tenderness.   Neurological: He is alert and oriented to person, place, and time.   Shuffling gait and flat affect   Skin: Skin is warm and dry. No rash noted.   Psychiatric: He has a normal mood and affect.       Assessment:     1. Mobitz type 2 second degree heart block         Medical Decision Making:  Today's Assessment / Status / Plan:       Spent more than 35 minutes going over his extensive chart      Pacemaker/heart block  Discussed physiology  Normal echo in January the time of hospitalization  Normal exam today, no malfunctions noted on pacemaker check  Continue current medications    Noncardiac issues/end-of-life care  Discussed, she certainly has anxiety about this  No change in current function but they will discuss with his primary care    RTC 6 months sooner with concerns  Supportive care given at length  "

## 2018-02-27 NOTE — NON-PROVIDER
Final threshold testing today. Appropriate device function demonstrated. Wound site appears clear. See back in 4 months.

## 2018-03-13 ENCOUNTER — OFFICE VISIT (OUTPATIENT)
Dept: NEUROLOGY | Facility: MEDICAL CENTER | Age: 83
End: 2018-03-13
Payer: MEDICARE

## 2018-03-13 VITALS
RESPIRATION RATE: 17 BRPM | WEIGHT: 176.81 LBS | HEART RATE: 68 BPM | TEMPERATURE: 97.3 F | HEIGHT: 67 IN | DIASTOLIC BLOOD PRESSURE: 70 MMHG | SYSTOLIC BLOOD PRESSURE: 118 MMHG | BODY MASS INDEX: 27.75 KG/M2 | OXYGEN SATURATION: 96 %

## 2018-03-13 DIAGNOSIS — G20.A1 PARKINSON DISEASE: Primary | ICD-10-CM

## 2018-03-13 DIAGNOSIS — G20.A1 DEMENTIA DUE TO PARKINSON'S DISEASE WITHOUT BEHAVIORAL DISTURBANCE (HCC): ICD-10-CM

## 2018-03-13 DIAGNOSIS — F02.80 DEMENTIA DUE TO PARKINSON'S DISEASE WITHOUT BEHAVIORAL DISTURBANCE (HCC): ICD-10-CM

## 2018-03-13 PROCEDURE — 99205 OFFICE O/P NEW HI 60 MIN: CPT | Performed by: PSYCHIATRY & NEUROLOGY

## 2018-03-13 RX ORDER — RIVASTIGMINE 9.5 MG/24H
1 PATCH, EXTENDED RELEASE TRANSDERMAL DAILY
Qty: 30 PATCH | Refills: 6 | Status: SHIPPED | OUTPATIENT
Start: 2018-03-13 | End: 2018-03-15

## 2018-03-13 RX ORDER — PRAMIPEXOLE DIHYDROCHLORIDE 0.75 MG/1
1 TABLET ORAL 3 TIMES DAILY
Qty: 90 TAB | Refills: 6 | Status: SHIPPED | OUTPATIENT
Start: 2018-03-13

## 2018-03-13 RX ORDER — RIVASTIGMINE 4.6 MG/24H
1 PATCH, EXTENDED RELEASE TRANSDERMAL DAILY
Qty: 30 PATCH | Refills: 0 | Status: SHIPPED | OUTPATIENT
Start: 2018-03-13 | End: 2018-03-15

## 2018-03-13 ASSESSMENT — ENCOUNTER SYMPTOMS
MEMORY LOSS: 1
DIZZINESS: 0
FALLS: 0
LOSS OF CONSCIOUSNESS: 0
DEPRESSION: 0
DOUBLE VISION: 0
TREMORS: 1
INSOMNIA: 0
CONSTIPATION: 1
HALLUCINATIONS: 1

## 2018-03-13 ASSESSMENT — PAIN SCALES - GENERAL: PAINLEVEL: NO PAIN

## 2018-03-14 NOTE — PROGRESS NOTES
Subjective:      Shukri Morrow is a 82 y.o. male who presents with his wife Reyna, and daughter, for consultation from the office of Dr. Keene, with a history of Parkinson's disease with associated dementia.    HPI    History is gotten from discussed with the patient but also with family. He had been exhibiting right upper extremity tremor with some loss of dexterity and minimal balance problems progressive over a couple of years, diagnosed in 2015 with Parkinson's disease. Symptoms have been fairly stable from a motor standpoint, most of the issues now having to do with cognitive deficits, hallucinations, etc.    He is having real problems with mental processing and forgetfulness. He misplaces things all the time, requires help from Reyna with his medications, etc. He still can do most of his daily activities with limited impairment, this mostly because of his motor symptoms. He has greater difficulty with multitasking, if he doesn't focus, he will leave things incomplete, Reyna has to remind him that he has done so. There has been some incontinence of both bowel and bladder simply because he does not live himself enough time to get to the bathroom. There is a mild increase in the distractibility he exhibits. Hallucinations have become more problematic, though they were occurring with some infrequency prior to the diagnosis being established. He was having nightmares, agitated delusional thoughts, hallucinations mostly visual, but these are no longer frightening.    His voice volume has diminished, he can drool, but there are no issues with swallowing as long as he chews slowly and with smaller portions in his mouth. There are no issues with nausea vomiting and early satiety and anorexia. There is significant anosmia. Right upper extremity was most symptomatic, the tremor now controlled on medicine, though there is also some loss of dexterity. Movements in general are slower, mostly with bradykinesia. He walks with  occasional shuffling quality, there is some mild hesitancy when he first stands to walk, though he does do so easily once he gets going. He is not falling with regularity.    He is quite constipated, there are no issues with urinary retention. They deny other symptoms of dysautonomia including persistent orthostatic dizziness and syncope, early satiety, etc.    Now on a combination of Sinemet and Mirapex (he has been on no other dopamine medications, Symmetrel, etc.), his wife notes that medications will wear off after about 4 hours, but they denied dyskinesias, fluctuations or on/off episodes. He was recently placed on Aricept 10 mg daily, with questionable benefit but it is making him quite sleepy.    When diagnosed, MRI scan of the brain was evidently unremarkable. EEG studies were not done.    He has a history of BPH, dyslipidemia, CNS atherosclerotic disease and an arrhythmia, no history of diagnosed stroke, CAD, PVD, malignancy, thyroid disease, seizure, migraine, pulmonary disease, hypertension or diabetes. He is status post pacemaker placement 2 months ago because of what sounds like is a second-degree heart block. None of the family has a history of Parkinson's disease or other neurodegenerative disease, tremor, CVA, seizure or migraine. He quit tobacco use over 30 years ago, he rarely drinks alcohol. He still goes to the gym regularly, but has loss of interest in his routines including walking, kickboxing, etc., all of this since his pacemaker placement.    He is on Aricept 10 mg in the morning, Proscar, Lopid, Mirapex 0.75 milligrams, 3 times a day taken with Sinemet 25/100, #1 tablet 3 times a day, and baby aspirin daily.    Review of Systems   Constitutional: Negative for malaise/fatigue.   HENT: Positive for hearing loss.    Eyes: Negative for double vision.   Gastrointestinal: Positive for constipation.   Genitourinary: Negative for dysuria.   Musculoskeletal: Negative for falls.   Neurological:  "Positive for tremors. Negative for dizziness and loss of consciousness.   Psychiatric/Behavioral: Positive for hallucinations and memory loss. Negative for depression. The patient does not have insomnia.    All other systems reviewed and are negative.       Objective:     /70   Pulse 68   Temp 36.3 °C (97.3 °F)   Resp 17   Ht 1.689 m (5' 6.5\")   Wt 80.2 kg (176 lb 12.9 oz)   SpO2 96%   BMI 28.11 kg/m²      Physical Exam    He appears in no acute distress. His vital signs are stable. He is clean and appropriately dressed, he is quite cooperative. There was no malar rash or sialorrhea. Hearing acuity is curtailed. Neck is supple, range of motion is full. Glabellar tap is absent. Cardiac evaluation reveals a regular rhythm.    He is fully oriented though he has some difficulty having the actual date of the month. He does self-correct. There is no aphasia, apraxia, or inattention. Mental processing is slowed.    PERRLA/EOMI, eye blink frequency is diminished, visual fields are still full, there is hypophonia but no tachyphemia. Facial movements are symmetric, there is no sensory loss to temperature on both sides of the face, the tongue and uvula are midline. Jaw jerk is absent. Shoulder shrug is symmetric.    There is no tremor, rigidity more patent with the right upper than the left upper extremity. There is no drift. Strength is intact bilaterally. Bradykinesia is mild. Reflexes are present at all points, ankle jerks are diminished, both toes are downgoing.    He stands slowly but with only minimal hesitancy on gait initiation. Stride length is fairly well-maintained, he does not shuffle, but he is also stooped. He requires only 4 steps to turn, there is retropulsion when pulled backwards. There is no appendicular dystaxia. Repetitive movements are diminished with the right hand when compared to the left. They're symmetrically diminished with the feet.    Sensory exam is intact to vibration, there is no " lateralized sensory loss to temperature.     Assessment/Plan:     1. Parkinson disease (CMS-HCC)  For now, I will continue the Sinemet and Mirapex regimen, though I am concerned about the hallucinations that are most likely dopamine induced. Nuplazid might be the option for him to help control these types of hallucinations, but fortunately, the motor symptoms he has a well-controlled so at least we do not need to elevate the dose. If I can get some of the dementing symptoms better controlled with medication such as Exelon (see below) this, this may also help some of the hallucinations. With his present regimen, we are not seeing wearing off or dyskinesias.    - carbidopa-levodopa (SINEMET)  MG Tab; Take 1 Tab by mouth 4 times a day.  Dispense: 120 Tab; Refill: 6  - Pramipexole Dihydrochloride 0.75 MG Tab; Take 1 Tab by mouth 3 times a day.  Dispense: 90 Tab; Refill: 6    2. 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 Exelon is the drug of choice, so this will be substituted. They were told that it may take upwards of 6 months before benefit is seen, hopefully it will help remove some of the mental fog and slowness that he is exhibiting, though typically the benefit simply maintaining the status quo for longer interval before things begin to deteriorate. Exelon 4.6 mg patch will be applied daily for one month, then 9.5 mg patch, eventually 13.3 mg patches. Side effects were reviewed.    - rivastigmine (EXELON) 4.6 MG/24HR patch; Apply 1 Patch to skin as directed every day.  Dispense: 30 Patch; Refill: 0  - rivastigmine (EXELON) 9.5 MG/24HR patch; Apply 1 Patch to skin as directed every day.  Dispense: 30 Patch; Refill: 6    Face-to-face time was spent reviewing all the above. We spent some time talking about the nature of  his disease, both motor and nonmotor symptoms that can occur, treatment options available, etc. We talked briefly about the long-term prognosis and the need for a regimented medication protocol, also why he needs to remain physically active, he was encouraged forcefully to do so since this simple intervention is clearly shown to diminish the need for medication and to improve functional outcomes early on. We will follow-up otherwise in 6 months. Phone contact as we adjust medication.    Time: Evaluation of 60 minutes for exam, review, discussion, and education  Discussion: As mentioned in the assessment, over 50% of the time spent face-to-face counseling and coordinating care

## 2018-03-15 ENCOUNTER — TELEPHONE (OUTPATIENT)
Dept: NEUROLOGY | Facility: MEDICAL CENTER | Age: 83
End: 2018-03-15

## 2018-03-15 DIAGNOSIS — G20.A1 DEMENTIA DUE TO PARKINSON'S DISEASE WITHOUT BEHAVIORAL DISTURBANCE (HCC): ICD-10-CM

## 2018-03-15 DIAGNOSIS — F02.80 DEMENTIA DUE TO PARKINSON'S DISEASE WITHOUT BEHAVIORAL DISTURBANCE (HCC): ICD-10-CM

## 2018-03-15 RX ORDER — RIVASTIGMINE TARTRATE 1.5 MG/1
CAPSULE ORAL
Qty: 240 CAP | Refills: 1 | Status: SHIPPED | OUTPATIENT
Start: 2018-03-15 | End: 2018-05-03 | Stop reason: SDUPTHER

## 2018-03-15 NOTE — TELEPHONE ENCOUNTER
Patient's wife called for Dr. Oden. She states the exelon patches are over $200 and they can not afford them. Is there a cheaper alternative they can try? Please advise.

## 2018-03-16 NOTE — TELEPHONE ENCOUNTER
We can use the the tablets for Exelon, starting at 1.5 mg, twice a day, increasing every other week to 3 mg twice a day, then 4.5 mg twice a day, eventually 6 mg, twice a day. Tell them to watch for GI side effects, they can be a problem with the tablet form. He can take the medication with food which often helps.

## 2018-04-11 DIAGNOSIS — G20.A1 PARKINSON DISEASE: ICD-10-CM

## 2018-05-03 DIAGNOSIS — F02.80 DEMENTIA DUE TO PARKINSON'S DISEASE WITHOUT BEHAVIORAL DISTURBANCE (HCC): ICD-10-CM

## 2018-05-03 DIAGNOSIS — G20.A1 DEMENTIA DUE TO PARKINSON'S DISEASE WITHOUT BEHAVIORAL DISTURBANCE (HCC): ICD-10-CM

## 2018-05-07 RX ORDER — RIVASTIGMINE TARTRATE 6 MG/1
6 CAPSULE ORAL 2 TIMES DAILY
Qty: 180 CAP | Refills: 3 | Status: SHIPPED | OUTPATIENT
Start: 2018-05-07

## 2018-05-11 ENCOUNTER — TELEPHONE (OUTPATIENT)
Dept: NEUROLOGY | Facility: MEDICAL CENTER | Age: 83
End: 2018-05-11

## 2018-05-11 NOTE — TELEPHONE ENCOUNTER
Patient's wife, Reyna, called for Dr. Oden in regards to rivastigmine caps. She states there was no benefit from these pills and she would like to have Shukri discontinue. Does he need to wean off or can he simply stop taking them? Please advise.

## 2018-05-13 NOTE — TELEPHONE ENCOUNTER
"Tell them that there is not going to be immediate \"improvement\" on this medication. I told them that the benefit is over the long term in slowing deterioration. I want him on the medication until I see them on f/u in the office. Otherwise it would be a waste of an opportunity to help him.  "

## 2018-05-14 NOTE — TELEPHONE ENCOUNTER
I spoke with the patient's wife to let them know you wanted the patient to continue the rivastigmine caps and she does not want to do it. I explained to her that there wouldn't be immediate benefit and that he needs to stay on it long-term to help slow any deteriation of his disease. Even knowing that information she states she does not want to give him the medication anymore. She said she talked it over with her kids and they do not want him to be on any medication for his dementia, they only want the parkinson's treated right now. They said they want to have him off of it for the next 4 months till he sees Dr. Oden again in September to see if there have been any changes. I did again state that without this medication that his dementia may progress more quickly with him being off of the medication and that the rivastigmine could potentially help slow it. She understood and still did not want him to continue taking the medication.

## 2018-05-15 NOTE — TELEPHONE ENCOUNTER
The family always has the choice about what medicines for the patient to take or not to take. They understand enough about it to make an intelligent decision.

## 2018-05-24 DIAGNOSIS — E78.5 DYSLIPIDEMIA: ICD-10-CM

## 2018-05-25 RX ORDER — GEMFIBROZIL 600 MG/1
TABLET, FILM COATED ORAL
Qty: 180 TAB | Refills: 1 | Status: SHIPPED | OUTPATIENT
Start: 2018-05-25

## 2018-05-25 NOTE — TELEPHONE ENCOUNTER
Pt has had OV within the 12 month protocol and lipid panel is current. 6 month supply sent to pharmacy.   Lab Results   Component Value Date/Time    CHOLSTRLTOT 153 09/26/2017 07:36 AM    LDL 92 09/26/2017 07:36 AM    HDL 39 (A) 09/26/2017 07:36 AM    TRIGLYCERIDE 111 09/26/2017 07:36 AM       Lab Results   Component Value Date/Time    SODIUM 142 01/07/2018 02:19 AM    POTASSIUM 4.3 01/07/2018 02:19 AM    CHLORIDE 112 01/07/2018 02:19 AM    CO2 25 01/07/2018 02:19 AM    GLUCOSE 118 (H) 01/07/2018 02:19 AM    BUN 28 (H) 01/07/2018 02:19 AM    CREATININE 1.16 01/07/2018 02:19 AM     Lab Results   Component Value Date/Time    ALKPHOSPHAT 95 01/06/2018 07:29 PM    ASTSGOT 23 01/06/2018 07:29 PM    ALTSGPT 6 01/06/2018 07:29 PM    TBILIRUBIN 0.4 01/06/2018 07:29 PM

## 2018-07-09 ENCOUNTER — TELEPHONE (OUTPATIENT)
Dept: NEUROLOGY | Facility: MEDICAL CENTER | Age: 83
End: 2018-07-09

## 2018-07-09 NOTE — TELEPHONE ENCOUNTER
Patient's wife called for Dr. Oden. Patient has been experiencing non stop hallucinations, even to the point where he called the police when his wife had left the house briefly. She is wondering if there is anything that you can do in the interim? He is not scheduled to see you till mid-September for a follow up appt. His medications are all the same; Sinemet  #1 4 times a day, pramipexole 0.75mg TID and Exelon 6mg BID. Please advise.

## 2018-07-19 ENCOUNTER — TELEPHONE (OUTPATIENT)
Dept: NEUROLOGY | Facility: MEDICAL CENTER | Age: 83
End: 2018-07-19

## 2018-07-19 NOTE — TELEPHONE ENCOUNTER
Assuming infections, metabolic derangements, etc. are ruled out, Mirapex should be stopped completely. His parkinsonian motor symptoms will worsen, but I suspect the hallucinations will definitely improved. In that circumstance, we will probably need to use Sinemet monotherapy.

## 2018-07-19 NOTE — TELEPHONE ENCOUNTER
Patient's wife called for Dr. Oden and wanted to let him know he had to be taken to the hospital because of his severe hallucinations. He was seen by his PCP and had no infection, normal EKG and normal lab work, but his hallucinations are still very severe. They would like to know what to do next? Please advise.

## 2018-09-13 ENCOUNTER — NON-PROVIDER VISIT (OUTPATIENT)
Dept: CARDIOLOGY | Facility: MEDICAL CENTER | Age: 83
End: 2018-09-13
Payer: MEDICARE

## 2018-09-13 ENCOUNTER — OFFICE VISIT (OUTPATIENT)
Dept: CARDIOLOGY | Facility: MEDICAL CENTER | Age: 83
End: 2018-09-13
Payer: MEDICARE

## 2018-09-13 VITALS
DIASTOLIC BLOOD PRESSURE: 54 MMHG | WEIGHT: 142 LBS | HEIGHT: 68 IN | BODY MASS INDEX: 21.52 KG/M2 | SYSTOLIC BLOOD PRESSURE: 93 MMHG | OXYGEN SATURATION: 99 % | HEART RATE: 72 BPM

## 2018-09-13 DIAGNOSIS — I44.1 MOBITZ TYPE 2 SECOND DEGREE HEART BLOCK: ICD-10-CM

## 2018-09-13 DIAGNOSIS — Z51.5 ENCOUNTER FOR END OF LIFE CARE: ICD-10-CM

## 2018-09-13 PROCEDURE — 99497 ADVNCD CARE PLAN 30 MIN: CPT | Performed by: INTERNAL MEDICINE

## 2018-09-13 PROCEDURE — 99214 OFFICE O/P EST MOD 30 MIN: CPT | Performed by: INTERNAL MEDICINE

## 2018-09-13 PROCEDURE — 93280 PM DEVICE PROGR EVAL DUAL: CPT | Performed by: INTERNAL MEDICINE

## 2018-09-13 RX ORDER — QUETIAPINE FUMARATE 25 MG/1
75 TABLET, FILM COATED ORAL
COMMUNITY

## 2018-09-13 RX ORDER — MIRTAZAPINE 30 MG/1
30 TABLET, FILM COATED ORAL NIGHTLY
COMMUNITY

## 2018-09-13 RX ORDER — DOCUSATE SODIUM 100 MG/1
100 CAPSULE, LIQUID FILLED ORAL 2 TIMES DAILY
COMMUNITY

## 2018-09-13 RX ORDER — QUETIAPINE FUMARATE 100 MG/1
200 TABLET, FILM COATED ORAL
COMMUNITY

## 2018-09-13 RX ORDER — LORAZEPAM 0.5 MG/1
0.5 TABLET ORAL EVERY 4 HOURS PRN
COMMUNITY

## 2018-09-13 NOTE — PROGRESS NOTES
Subjective:   Shukri Morrow is a 82 y.o. male who presents today   In regards to his recent pacemaker    Had another hospitalization, now in a nursing home.  She is happy with the care he is receiving there.   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  Family History   Problem Relation Age of Onset   • Diabetes Mother      History   Smoking Status   • Former Smoker   • Packs/day: 1.00   • Years: 30.00   • Quit date: 1/1/1990   Smokeless Tobacco   • Former User   • Quit date: 1/1/1993     No Known Allergies  Outpatient Encounter Prescriptions as of 9/13/2018   Medication Sig Dispense Refill   • gemfibrozil (LOPID) 600 MG Tab TAKE 1 TABLET BY MOUTH TWICE A  Tab 1   • carbidopa-levodopa (SINEMET)  MG Tab Take 1 Tab by mouth 4 times a day. 120 Tab 6   • rivastigmine (EXELON) 6 MG capsule Take 1 Cap by mouth 2 times a day. 180 Cap 3   • Pramipexole Dihydrochloride 0.75 MG Tab Take 1 Tab by mouth 3 times a day. (Patient not taking: Reported on 9/13/2018) 90 Tab 6   • finasteride (PROSCAR) 5 MG Tab Take 1 Tab by mouth every day. 90 Tab 1   • therapeutic multivitamin-minerals (THERAGRAN-M) Tab Take 1 Tab by mouth every morning.     • aspirin 81 MG tablet Take 81 mg by mouth every evening.       No facility-administered encounter medications on file as of 9/13/2018.      Review of Systems   Unable to perform ROS: Medical condition        Objective:   BP (!) 93/54   Pulse 72   Ht 1.727 m (5'  "8\")   Wt 64.4 kg (142 lb)   SpO2 99%   BMI 21.59 kg/m²     Physical Exam   Constitutional: He is oriented to person, place, and time.   Thin and elderly, pin rolling tremor at rest right more than left.  Patient seen and examined again today, changes noted.  In a wheelchair   HENT:   Head: Normocephalic and atraumatic.   Eyes: Pupils are equal, round, and reactive to light. EOM are normal. No scleral icterus.   Neck: Neck supple. No JVD present. No thyromegaly present.   Cardiovascular: Normal rate and regular rhythm.  Exam reveals no gallop.    No murmur heard.  Pulmonary/Chest: Breath sounds normal. No respiratory distress. He exhibits no tenderness.   Pacemaker well-seated and well-healed in the left chest   Abdominal: Soft. Bowel sounds are normal. He exhibits no distension.   Musculoskeletal: He exhibits no edema or tenderness.   Lymphadenopathy:     He has no cervical adenopathy.   Neurological: He is alert and oriented to person, place, and time.   flat affect   Skin: Skin is warm and dry. No rash noted.   Psychiatric: He has a normal mood and affect.       Assessment:     1. Mobitz type 2 second degree heart block     2. Encounter for end of life care         Medical Decision Making:  Today's Assessment / Status / Plan:       Spent more than 35 minutes going over his extensive chart -again.  He was full code and walking in a big burden to her family.  Amazing work on end-of-life care by us in the last 6 months      Pacemaker/heart block  Discussed physiology  Normal echo in January the time of hospitalization  Normal exam today, no malfunctions noted on pacemaker check  Continue current medications    Noncardiac issues/end-of-life care  Discussed again, she certainly has anxiety about this we are working through it.  Spent more than 30 minutes.  Filled forms.  He is DNR for sure    RTC 6 months sooner with concerns  Supportive care given at length    Physical Exam   Constitutional: He is oriented to " person, place, and time.   Thin and elderly, pin rolling tremor at rest right more than left.  Patient seen and examined again today, changes noted.  In a wheelchair   HENT:   Head: Normocephalic and atraumatic.   Eyes: Pupils are equal, round, and reactive to light. EOM are normal. No scleral icterus.   Neck: Neck supple. No JVD present. No thyromegaly present.   Cardiovascular: Normal rate and regular rhythm.  Exam reveals no gallop.    No murmur heard.  Pulmonary/Chest: Breath sounds normal. No respiratory distress. He exhibits no tenderness.   Pacemaker well-seated and well-healed in the left chest   Abdominal: Soft. Bowel sounds are normal. He exhibits no distension.   Musculoskeletal: He exhibits no edema or tenderness.   Lymphadenopathy:     He has no cervical adenopathy.   Neurological: He is alert and oriented to person, place, and time.   flat affect   Skin: Skin is warm and dry. No rash noted.   Psychiatric: He has a normal mood and affect.

## 2018-09-17 ENCOUNTER — TELEPHONE (OUTPATIENT)
Dept: CARDIOLOGY | Facility: MEDICAL CENTER | Age: 83
End: 2018-09-17

## 2018-09-17 NOTE — TELEPHONE ENCOUNTER
Wife left message on my voicemail- Patient had question regarding medication prior to cataract surgery today-- wife's number is [phone].

## 2018-09-18 ENCOUNTER — APPOINTMENT (OUTPATIENT)
Dept: NEUROLOGY | Facility: MEDICAL CENTER | Age: 83
End: 2018-09-18
Payer: MEDICARE

## 2019-01-15 ENCOUNTER — HOSPITAL ENCOUNTER (INPATIENT)
Dept: HOSPITAL 8 - 3E | Age: 84
LOS: 29 days | Discharge: HOSPICE | DRG: 56 | End: 2019-02-13
Attending: PSYCHIATRY & NEUROLOGY | Admitting: PSYCHIATRY & NEUROLOGY
Payer: MEDICARE

## 2019-01-15 ENCOUNTER — HOSPITAL ENCOUNTER (EMERGENCY)
Dept: HOSPITAL 8 - ED | Age: 84
Discharge: HOME | End: 2019-01-15
Payer: MEDICARE

## 2019-01-15 VITALS — SYSTOLIC BLOOD PRESSURE: 150 MMHG | DIASTOLIC BLOOD PRESSURE: 83 MMHG

## 2019-01-15 VITALS — BODY MASS INDEX: 22.92 KG/M2 | WEIGHT: 142.64 LBS | HEIGHT: 66 IN

## 2019-01-15 VITALS — DIASTOLIC BLOOD PRESSURE: 70 MMHG | SYSTOLIC BLOOD PRESSURE: 111 MMHG

## 2019-01-15 DIAGNOSIS — F01.51: ICD-10-CM

## 2019-01-15 DIAGNOSIS — Z66: ICD-10-CM

## 2019-01-15 DIAGNOSIS — G31.83: Primary | ICD-10-CM

## 2019-01-15 DIAGNOSIS — Z02.9: Primary | ICD-10-CM

## 2019-01-15 DIAGNOSIS — Z87.891: ICD-10-CM

## 2019-01-15 DIAGNOSIS — R63.4: ICD-10-CM

## 2019-01-15 DIAGNOSIS — Z95.0: ICD-10-CM

## 2019-01-15 DIAGNOSIS — F02.81: ICD-10-CM

## 2019-01-15 DIAGNOSIS — Z82.49: ICD-10-CM

## 2019-01-15 DIAGNOSIS — F41.1: ICD-10-CM

## 2019-01-15 DIAGNOSIS — H10.33: ICD-10-CM

## 2019-01-15 DIAGNOSIS — F41.9: ICD-10-CM

## 2019-01-15 DIAGNOSIS — J15.0: ICD-10-CM

## 2019-01-15 DIAGNOSIS — Z83.3: ICD-10-CM

## 2019-01-15 DIAGNOSIS — R32: ICD-10-CM

## 2019-01-15 DIAGNOSIS — E87.6: ICD-10-CM

## 2019-01-15 DIAGNOSIS — N39.0: ICD-10-CM

## 2019-01-15 DIAGNOSIS — Z87.440: ICD-10-CM

## 2019-01-15 PROCEDURE — 87205 SMEAR GRAM STAIN: CPT

## 2019-01-15 PROCEDURE — 87086 URINE CULTURE/COLONY COUNT: CPT

## 2019-01-15 PROCEDURE — 82746 ASSAY OF FOLIC ACID SERUM: CPT

## 2019-01-15 PROCEDURE — 81001 URINALYSIS AUTO W/SCOPE: CPT

## 2019-01-15 PROCEDURE — 84439 ASSAY OF FREE THYROXINE: CPT

## 2019-01-15 PROCEDURE — 80053 COMPREHEN METABOLIC PANEL: CPT

## 2019-01-15 PROCEDURE — 87077 CULTURE AEROBIC IDENTIFY: CPT

## 2019-01-15 PROCEDURE — 82607 VITAMIN B-12: CPT

## 2019-01-15 PROCEDURE — 86480 TB TEST CELL IMMUN MEASURE: CPT

## 2019-01-15 PROCEDURE — 85025 COMPLETE CBC W/AUTO DIFF WBC: CPT

## 2019-01-15 PROCEDURE — 86592 SYPHILIS TEST NON-TREP QUAL: CPT

## 2019-01-15 PROCEDURE — 84443 ASSAY THYROID STIM HORMONE: CPT

## 2019-01-15 PROCEDURE — 70450 CT HEAD/BRAIN W/O DYE: CPT

## 2019-01-15 PROCEDURE — 81003 URINALYSIS AUTO W/O SCOPE: CPT

## 2019-01-15 PROCEDURE — 71045 X-RAY EXAM CHEST 1 VIEW: CPT

## 2019-01-15 PROCEDURE — 93005 ELECTROCARDIOGRAM TRACING: CPT

## 2019-01-15 PROCEDURE — 87070 CULTURE OTHR SPECIMN AEROBIC: CPT

## 2019-01-15 PROCEDURE — 36415 COLL VENOUS BLD VENIPUNCTURE: CPT

## 2019-01-15 PROCEDURE — 80061 LIPID PANEL: CPT

## 2019-01-15 PROCEDURE — 87186 SC STD MICRODIL/AGAR DIL: CPT

## 2019-01-15 RX ADMIN — RISPERIDONE SCH MG: 1 TABLET, ORALLY DISINTEGRATING ORAL at 21:10

## 2019-01-15 RX ADMIN — MELATONIN TAB 3 MG SCH MG: 3 TAB at 21:10

## 2019-01-16 VITALS — SYSTOLIC BLOOD PRESSURE: 109 MMHG | DIASTOLIC BLOOD PRESSURE: 66 MMHG

## 2019-01-16 LAB
ALBUMIN SERPL-MCNC: 3.6 G/DL (ref 3.4–5)
ALP SERPL-CCNC: 84 U/L (ref 45–117)
ALT SERPL-CCNC: 23 U/L (ref 12–78)
ANION GAP SERPL CALC-SCNC: 6 MMOL/L (ref 5–15)
BASOPHILS # BLD AUTO: 0.02 X10^3/UL (ref 0–0.1)
BASOPHILS NFR BLD AUTO: 0 % (ref 0–1)
BILIRUB SERPL-MCNC: 0.4 MG/DL (ref 0.2–1)
CALCIUM SERPL-MCNC: 9.1 MG/DL (ref 8.5–10.1)
CHLORIDE SERPL-SCNC: 111 MMOL/L (ref 98–107)
CHOL/HDL RATIO: 2.8
CREAT SERPL-MCNC: 0.83 MG/DL (ref 0.7–1.3)
EOSINOPHIL # BLD AUTO: 0.13 X10^3/UL (ref 0–0.4)
EOSINOPHIL NFR BLD AUTO: 2 % (ref 1–7)
ERYTHROCYTE [DISTWIDTH] IN BLOOD BY AUTOMATED COUNT: 15.2 % (ref 9.4–14.8)
FOLATE SERPL-MCNC: 7.9 NG/ML (ref 3.1–17.5)
HDL CHOL %: 36 % (ref 26–37)
HDL CHOLESTEROL (DIRECT): 38 MG/DL (ref 40–60)
LDL CHOLESTEROL,CALCULATED: 39 MG/DL (ref 54–169)
LDLC/HDLC SERPL: 1 {RATIO} (ref 0.5–3)
LYMPHOCYTES # BLD AUTO: 1.1 X10^3/UL (ref 1–3.4)
LYMPHOCYTES NFR BLD AUTO: 20 % (ref 22–44)
MCH RBC QN AUTO: 30.6 PG (ref 27.5–34.5)
MCHC RBC AUTO-ENTMCNC: 34.6 G/DL (ref 33.2–36.2)
MCV RBC AUTO: 88.6 FL (ref 81–97)
MD: NO
MONOCYTES # BLD AUTO: 0.55 X10^3/UL (ref 0.2–0.8)
MONOCYTES NFR BLD AUTO: 10 % (ref 2–9)
NEUTROPHILS # BLD AUTO: 3.83 X10^3/UL (ref 1.8–6.8)
NEUTROPHILS NFR BLD AUTO: 68 % (ref 42–75)
PLATELET # BLD AUTO: 133 X10^3/UL (ref 130–400)
PMV BLD AUTO: 9.3 FL (ref 7.4–10.4)
PROT SERPL-MCNC: 5.9 G/DL (ref 6.4–8.2)
RBC # BLD AUTO: 4.29 X10^6/UL (ref 4.38–5.82)
T4 FREE SERPL-MCNC: 0.69 NG/DL (ref 0.76–1.46)
TRIGL SERPL-MCNC: 143 MG/DL (ref 50–200)
TSH SERPL-ACNC: 2.81 MIU/L (ref 0.36–3.74)
VLDLC SERPL CALC-MCNC: 29 MG/DL (ref 0–25)

## 2019-01-16 RX ADMIN — RISPERIDONE SCH MG: 1 TABLET, ORALLY DISINTEGRATING ORAL at 09:49

## 2019-01-16 RX ADMIN — RISPERIDONE SCH MG: 1 TABLET, ORALLY DISINTEGRATING ORAL at 20:19

## 2019-01-16 RX ADMIN — MELATONIN TAB 3 MG SCH MG: 3 TAB at 20:18

## 2019-01-16 RX ADMIN — DIAZEPAM SCH MG: 2 TABLET ORAL at 20:18

## 2019-01-16 RX ADMIN — OXCARBAZEPINE SCH MG: 150 TABLET, FILM COATED ORAL at 20:18

## 2019-01-17 VITALS — SYSTOLIC BLOOD PRESSURE: 119 MMHG | DIASTOLIC BLOOD PRESSURE: 65 MMHG

## 2019-01-17 VITALS — SYSTOLIC BLOOD PRESSURE: 143 MMHG | DIASTOLIC BLOOD PRESSURE: 75 MMHG

## 2019-01-17 VITALS — DIASTOLIC BLOOD PRESSURE: 67 MMHG | SYSTOLIC BLOOD PRESSURE: 120 MMHG

## 2019-01-17 LAB — MICROSCOPIC: (no result)

## 2019-01-17 RX ADMIN — MELATONIN TAB 3 MG SCH MG: 3 TAB at 20:56

## 2019-01-17 RX ADMIN — DIAZEPAM SCH MG: 2 TABLET ORAL at 20:56

## 2019-01-17 RX ADMIN — DIAZEPAM SCH MG: 2 TABLET ORAL at 08:22

## 2019-01-17 RX ADMIN — OXCARBAZEPINE SCH MG: 150 TABLET, FILM COATED ORAL at 08:22

## 2019-01-17 RX ADMIN — RISPERIDONE SCH MG: 1 TABLET, ORALLY DISINTEGRATING ORAL at 20:56

## 2019-01-17 RX ADMIN — RISPERIDONE SCH MG: 1 TABLET, ORALLY DISINTEGRATING ORAL at 08:23

## 2019-01-17 RX ADMIN — OXCARBAZEPINE SCH MG: 150 TABLET, FILM COATED ORAL at 20:56

## 2019-01-17 RX ADMIN — DIAZEPAM SCH MG: 2 TABLET ORAL at 15:22

## 2019-01-18 VITALS — DIASTOLIC BLOOD PRESSURE: 82 MMHG | SYSTOLIC BLOOD PRESSURE: 117 MMHG

## 2019-01-18 VITALS — SYSTOLIC BLOOD PRESSURE: 126 MMHG | DIASTOLIC BLOOD PRESSURE: 76 MMHG

## 2019-01-18 RX ADMIN — CARBIDOPA AND LEVODOPA SCH TAB: 25; 100 TABLET ORAL at 08:38

## 2019-01-18 RX ADMIN — OXCARBAZEPINE SCH MG: 150 TABLET, FILM COATED ORAL at 21:33

## 2019-01-18 RX ADMIN — DIAZEPAM SCH MG: 2 TABLET ORAL at 16:24

## 2019-01-18 RX ADMIN — RISPERIDONE SCH MG: 1 TABLET, ORALLY DISINTEGRATING ORAL at 08:39

## 2019-01-18 RX ADMIN — RISPERIDONE SCH MG: 1 TABLET, ORALLY DISINTEGRATING ORAL at 21:33

## 2019-01-18 RX ADMIN — DIAZEPAM SCH MG: 2 TABLET ORAL at 14:20

## 2019-01-18 RX ADMIN — OXCARBAZEPINE SCH MG: 150 TABLET, FILM COATED ORAL at 08:39

## 2019-01-18 RX ADMIN — DIAZEPAM SCH MG: 2 TABLET ORAL at 08:39

## 2019-01-18 RX ADMIN — MELATONIN TAB 3 MG SCH MG: 3 TAB at 21:33

## 2019-01-18 RX ADMIN — DIAZEPAM SCH MG: 2 TABLET ORAL at 21:33

## 2019-01-19 VITALS — SYSTOLIC BLOOD PRESSURE: 91 MMHG | DIASTOLIC BLOOD PRESSURE: 60 MMHG

## 2019-01-19 VITALS — SYSTOLIC BLOOD PRESSURE: 133 MMHG | DIASTOLIC BLOOD PRESSURE: 74 MMHG

## 2019-01-19 VITALS — DIASTOLIC BLOOD PRESSURE: 75 MMHG | SYSTOLIC BLOOD PRESSURE: 119 MMHG

## 2019-01-19 RX ADMIN — MELATONIN TAB 3 MG SCH MG: 3 TAB at 20:59

## 2019-01-19 RX ADMIN — OXCARBAZEPINE SCH MG: 150 TABLET, FILM COATED ORAL at 20:59

## 2019-01-19 RX ADMIN — RISPERIDONE SCH MG: 1 TABLET, ORALLY DISINTEGRATING ORAL at 09:19

## 2019-01-19 RX ADMIN — RISPERIDONE SCH MG: 0.5 TABLET ORAL at 20:59

## 2019-01-19 RX ADMIN — DIAZEPAM SCH MG: 2 TABLET ORAL at 20:59

## 2019-01-19 RX ADMIN — CARBIDOPA AND LEVODOPA SCH TAB: 25; 100 TABLET ORAL at 09:19

## 2019-01-19 RX ADMIN — DIAZEPAM SCH MG: 2 TABLET ORAL at 17:07

## 2019-01-19 RX ADMIN — OXCARBAZEPINE SCH MG: 150 TABLET, FILM COATED ORAL at 09:19

## 2019-01-19 RX ADMIN — DIAZEPAM SCH MG: 2 TABLET ORAL at 09:19

## 2019-01-20 VITALS — DIASTOLIC BLOOD PRESSURE: 70 MMHG | SYSTOLIC BLOOD PRESSURE: 117 MMHG

## 2019-01-20 VITALS — SYSTOLIC BLOOD PRESSURE: 129 MMHG | DIASTOLIC BLOOD PRESSURE: 59 MMHG

## 2019-01-20 RX ADMIN — DIAZEPAM SCH MG: 2 TABLET ORAL at 08:35

## 2019-01-20 RX ADMIN — OXCARBAZEPINE SCH MG: 150 TABLET, FILM COATED ORAL at 08:36

## 2019-01-20 RX ADMIN — MELATONIN TAB 3 MG SCH MG: 3 TAB at 20:56

## 2019-01-20 RX ADMIN — DIAZEPAM SCH MG: 2 TABLET ORAL at 20:56

## 2019-01-20 RX ADMIN — RISPERIDONE SCH MG: 0.5 TABLET ORAL at 08:35

## 2019-01-20 RX ADMIN — CARBIDOPA AND LEVODOPA SCH TAB: 25; 100 TABLET ORAL at 08:35

## 2019-01-20 RX ADMIN — DIAZEPAM SCH MG: 2 TABLET ORAL at 16:12

## 2019-01-20 RX ADMIN — OXCARBAZEPINE SCH MG: 150 TABLET, FILM COATED ORAL at 20:56

## 2019-01-21 VITALS — SYSTOLIC BLOOD PRESSURE: 132 MMHG | DIASTOLIC BLOOD PRESSURE: 75 MMHG

## 2019-01-21 VITALS — DIASTOLIC BLOOD PRESSURE: 79 MMHG | SYSTOLIC BLOOD PRESSURE: 135 MMHG

## 2019-01-21 VITALS — DIASTOLIC BLOOD PRESSURE: 68 MMHG | SYSTOLIC BLOOD PRESSURE: 115 MMHG

## 2019-01-21 LAB
<PLATELET ESTIMATE>: ADEQUATE
<PLT MORPHOLOGY>: (no result)
ALBUMIN SERPL-MCNC: 3.7 G/DL (ref 3.4–5)
ALP SERPL-CCNC: 97 U/L (ref 45–117)
ALT SERPL-CCNC: 12 U/L (ref 12–78)
ANION GAP SERPL CALC-SCNC: 8 MMOL/L (ref 5–15)
BILIRUB DIRECT SERPL-MCNC: NORMAL MG/DL
BILIRUB SERPL-MCNC: 0.5 MG/DL (ref 0.2–1)
CALCIUM SERPL-MCNC: 8.9 MG/DL (ref 8.5–10.1)
CHLORIDE SERPL-SCNC: 111 MMOL/L (ref 98–107)
CREAT SERPL-MCNC: 1.05 MG/DL (ref 0.7–1.3)
ERYTHROCYTE [DISTWIDTH] IN BLOOD BY AUTOMATED COUNT: 14.9 % (ref 9.4–14.8)
LYMPH#(MANUAL): 0.5 X10^3/UL (ref 1–3.4)
LYMPHS% (MANUAL): 4 % (ref 22–44)
MCH RBC QN AUTO: 30.6 PG (ref 27.5–34.5)
MCHC RBC AUTO-ENTMCNC: 34.3 G/DL (ref 33.2–36.2)
MCV RBC AUTO: 89.4 FL (ref 81–97)
MD: YES
MONOS#(MANUAL): 0.88 X10^3/UL (ref 0.3–2.7)
MONOS% (MANUAL): 7 % (ref 2–9)
PLATELET # BLD AUTO: 166 X10^3/UL (ref 130–400)
PMV BLD AUTO: 9.4 FL (ref 7.4–10.4)
PROT SERPL-MCNC: 6.5 G/DL (ref 6.4–8.2)
RBC # BLD AUTO: 4.86 X10^6/UL (ref 4.38–5.82)
SEG#(MANUAL): 11.13 X10^3/UL (ref 1.8–6.8)
SEGS% (MANUAL): 89 % (ref 42–75)

## 2019-01-21 RX ADMIN — DIAZEPAM SCH MG: 2 TABLET ORAL at 08:53

## 2019-01-21 RX ADMIN — DIAZEPAM SCH MG: 2 TABLET ORAL at 16:58

## 2019-01-21 RX ADMIN — CARBIDOPA AND LEVODOPA SCH TAB: 25; 100 TABLET ORAL at 08:52

## 2019-01-21 RX ADMIN — OXCARBAZEPINE SCH MG: 150 TABLET, FILM COATED ORAL at 20:35

## 2019-01-21 RX ADMIN — ACETAMINOPHEN PRN MG: 325 TABLET, FILM COATED ORAL at 17:51

## 2019-01-21 RX ADMIN — DIAZEPAM SCH MG: 2 TABLET ORAL at 20:35

## 2019-01-21 RX ADMIN — MELATONIN TAB 3 MG SCH MG: 3 TAB at 20:35

## 2019-01-21 RX ADMIN — OXCARBAZEPINE SCH MG: 150 TABLET, FILM COATED ORAL at 08:53

## 2019-01-22 VITALS — SYSTOLIC BLOOD PRESSURE: 135 MMHG | DIASTOLIC BLOOD PRESSURE: 70 MMHG

## 2019-01-22 VITALS — SYSTOLIC BLOOD PRESSURE: 117 MMHG | DIASTOLIC BLOOD PRESSURE: 68 MMHG

## 2019-01-22 LAB
CULTURE INDICATED?: YES
MICROSCOPIC: (no result)

## 2019-01-22 RX ADMIN — DIAZEPAM SCH MG: 2 TABLET ORAL at 20:08

## 2019-01-22 RX ADMIN — DIAZEPAM SCH MG: 2 TABLET ORAL at 17:00

## 2019-01-22 RX ADMIN — OXCARBAZEPINE SCH MG: 150 TABLET, FILM COATED ORAL at 08:40

## 2019-01-22 RX ADMIN — CARBIDOPA AND LEVODOPA SCH TAB: 25; 100 TABLET ORAL at 08:40

## 2019-01-22 RX ADMIN — MELATONIN TAB 3 MG SCH MG: 3 TAB at 20:08

## 2019-01-22 RX ADMIN — OXCARBAZEPINE SCH MG: 150 TABLET, FILM COATED ORAL at 20:08

## 2019-01-22 RX ADMIN — DIAZEPAM SCH MG: 2 TABLET ORAL at 08:40

## 2019-01-23 VITALS — DIASTOLIC BLOOD PRESSURE: 59 MMHG | SYSTOLIC BLOOD PRESSURE: 111 MMHG

## 2019-01-23 VITALS — DIASTOLIC BLOOD PRESSURE: 70 MMHG | SYSTOLIC BLOOD PRESSURE: 160 MMHG

## 2019-01-23 RX ADMIN — OXCARBAZEPINE SCH MG: 150 TABLET, FILM COATED ORAL at 20:51

## 2019-01-23 RX ADMIN — DIAZEPAM SCH MG: 2 TABLET ORAL at 20:51

## 2019-01-23 RX ADMIN — CEFTRIAXONE SCH MLS/HR: 1 INJECTION, SOLUTION INTRAVENOUS at 09:45

## 2019-01-23 RX ADMIN — CARBIDOPA AND LEVODOPA SCH TAB: 25; 100 TABLET ORAL at 08:21

## 2019-01-23 RX ADMIN — MELATONIN TAB 3 MG SCH MG: 3 TAB at 20:49

## 2019-01-23 RX ADMIN — OXCARBAZEPINE SCH MG: 150 TABLET, FILM COATED ORAL at 08:21

## 2019-01-23 RX ADMIN — DIAZEPAM SCH MG: 2 TABLET ORAL at 15:33

## 2019-01-23 RX ADMIN — DIAZEPAM SCH MG: 2 TABLET ORAL at 08:21

## 2019-01-24 VITALS — SYSTOLIC BLOOD PRESSURE: 152 MMHG | DIASTOLIC BLOOD PRESSURE: 85 MMHG

## 2019-01-24 VITALS — DIASTOLIC BLOOD PRESSURE: 66 MMHG | SYSTOLIC BLOOD PRESSURE: 107 MMHG

## 2019-01-24 RX ADMIN — CEFTRIAXONE SCH MLS/HR: 1 INJECTION, SOLUTION INTRAVENOUS at 07:51

## 2019-01-24 RX ADMIN — DIAZEPAM PRN MG: 2 TABLET ORAL at 13:10

## 2019-01-24 RX ADMIN — CARBIDOPA AND LEVODOPA SCH TAB: 25; 100 TABLET ORAL at 09:19

## 2019-01-24 RX ADMIN — OXCARBAZEPINE SCH MG: 150 TABLET, FILM COATED ORAL at 20:23

## 2019-01-24 RX ADMIN — OXCARBAZEPINE SCH MG: 150 TABLET, FILM COATED ORAL at 09:00

## 2019-01-24 RX ADMIN — DIAZEPAM SCH MG: 2 TABLET ORAL at 09:19

## 2019-01-24 RX ADMIN — DIAZEPAM PRN MG: 2 TABLET ORAL at 20:23

## 2019-01-25 VITALS — SYSTOLIC BLOOD PRESSURE: 114 MMHG | DIASTOLIC BLOOD PRESSURE: 71 MMHG

## 2019-01-25 VITALS — DIASTOLIC BLOOD PRESSURE: 61 MMHG | SYSTOLIC BLOOD PRESSURE: 96 MMHG

## 2019-01-25 VITALS — SYSTOLIC BLOOD PRESSURE: 121 MMHG | DIASTOLIC BLOOD PRESSURE: 75 MMHG

## 2019-01-25 VITALS — DIASTOLIC BLOOD PRESSURE: 80 MMHG | SYSTOLIC BLOOD PRESSURE: 134 MMHG

## 2019-01-25 VITALS — DIASTOLIC BLOOD PRESSURE: 73 MMHG | SYSTOLIC BLOOD PRESSURE: 119 MMHG

## 2019-01-25 VITALS — DIASTOLIC BLOOD PRESSURE: 75 MMHG | SYSTOLIC BLOOD PRESSURE: 135 MMHG

## 2019-01-25 VITALS — DIASTOLIC BLOOD PRESSURE: 71 MMHG | SYSTOLIC BLOOD PRESSURE: 115 MMHG

## 2019-01-25 VITALS — DIASTOLIC BLOOD PRESSURE: 61 MMHG | SYSTOLIC BLOOD PRESSURE: 98 MMHG

## 2019-01-25 RX ADMIN — DIAZEPAM PRN MG: 2 TABLET ORAL at 08:05

## 2019-01-25 RX ADMIN — CARBIDOPA AND LEVODOPA SCH TAB: 25; 100 TABLET ORAL at 08:05

## 2019-01-25 RX ADMIN — OXCARBAZEPINE SCH MG: 150 TABLET, FILM COATED ORAL at 08:05

## 2019-01-25 RX ADMIN — OXCARBAZEPINE SCH MG: 150 TABLET, FILM COATED ORAL at 19:39

## 2019-01-25 RX ADMIN — CEFTRIAXONE SCH MLS/HR: 1 INJECTION, SOLUTION INTRAVENOUS at 08:05

## 2019-01-25 NOTE — NUR
ANDREA TREVIZO -  Fall Risk Medications present and NOT receiving anticoagulants.

-------------------------------------------------------------------------------

Signed:    01/25/19 at 0984 by Benny PUENTE

-------------------------------------------------------------------------------

## 2019-01-26 VITALS — DIASTOLIC BLOOD PRESSURE: 78 MMHG | SYSTOLIC BLOOD PRESSURE: 150 MMHG

## 2019-01-26 VITALS — SYSTOLIC BLOOD PRESSURE: 122 MMHG | DIASTOLIC BLOOD PRESSURE: 72 MMHG

## 2019-01-26 VITALS — DIASTOLIC BLOOD PRESSURE: 70 MMHG | SYSTOLIC BLOOD PRESSURE: 117 MMHG

## 2019-01-26 RX ADMIN — CARBIDOPA AND LEVODOPA SCH TAB: 25; 100 TABLET ORAL at 08:44

## 2019-01-26 RX ADMIN — CEFTRIAXONE SCH MLS/HR: 1 INJECTION, SOLUTION INTRAVENOUS at 08:04

## 2019-01-26 RX ADMIN — OXCARBAZEPINE SCH MG: 150 TABLET, FILM COATED ORAL at 20:32

## 2019-01-26 RX ADMIN — OXCARBAZEPINE SCH MG: 150 TABLET, FILM COATED ORAL at 08:44

## 2019-01-26 RX ADMIN — DIAZEPAM PRN MG: 2 TABLET ORAL at 20:32

## 2019-01-27 VITALS — SYSTOLIC BLOOD PRESSURE: 121 MMHG | DIASTOLIC BLOOD PRESSURE: 85 MMHG

## 2019-01-27 VITALS — DIASTOLIC BLOOD PRESSURE: 64 MMHG | SYSTOLIC BLOOD PRESSURE: 98 MMHG

## 2019-01-27 RX ADMIN — DIAZEPAM PRN MG: 2 TABLET ORAL at 13:27

## 2019-01-27 RX ADMIN — CARBIDOPA AND LEVODOPA SCH TAB: 25; 100 TABLET ORAL at 08:50

## 2019-01-27 RX ADMIN — OXCARBAZEPINE SCH MG: 150 TABLET, FILM COATED ORAL at 20:24

## 2019-01-27 RX ADMIN — MELATONIN TAB 3 MG PRN MG: 3 TAB at 20:57

## 2019-01-27 RX ADMIN — CEFTRIAXONE SCH MLS/HR: 1 INJECTION, SOLUTION INTRAVENOUS at 07:37

## 2019-01-27 RX ADMIN — OXCARBAZEPINE SCH MG: 150 TABLET, FILM COATED ORAL at 08:50

## 2019-01-28 VITALS — DIASTOLIC BLOOD PRESSURE: 76 MMHG | SYSTOLIC BLOOD PRESSURE: 137 MMHG

## 2019-01-28 VITALS — SYSTOLIC BLOOD PRESSURE: 99 MMHG | DIASTOLIC BLOOD PRESSURE: 66 MMHG

## 2019-01-28 RX ADMIN — OXCARBAZEPINE SCH MG: 150 TABLET, FILM COATED ORAL at 09:22

## 2019-01-28 RX ADMIN — DIAZEPAM PRN MG: 2 TABLET ORAL at 21:27

## 2019-01-28 RX ADMIN — CEFTRIAXONE SCH MLS/HR: 1 INJECTION, SOLUTION INTRAVENOUS at 07:57

## 2019-01-28 RX ADMIN — MELATONIN TAB 3 MG PRN MG: 3 TAB at 20:02

## 2019-01-28 RX ADMIN — OXCARBAZEPINE SCH MG: 150 TABLET, FILM COATED ORAL at 20:02

## 2019-01-28 RX ADMIN — CARBIDOPA AND LEVODOPA SCH TAB: 25; 100 TABLET ORAL at 09:21

## 2019-01-28 RX ADMIN — ACETAMINOPHEN PRN MG: 325 TABLET, FILM COATED ORAL at 20:03

## 2019-01-29 VITALS — SYSTOLIC BLOOD PRESSURE: 100 MMHG | DIASTOLIC BLOOD PRESSURE: 52 MMHG

## 2019-01-29 VITALS — SYSTOLIC BLOOD PRESSURE: 104 MMHG | DIASTOLIC BLOOD PRESSURE: 62 MMHG

## 2019-01-29 VITALS — DIASTOLIC BLOOD PRESSURE: 62 MMHG | SYSTOLIC BLOOD PRESSURE: 104 MMHG

## 2019-01-29 RX ADMIN — DIAZEPAM PRN MG: 2 TABLET ORAL at 04:56

## 2019-01-29 RX ADMIN — CARBIDOPA AND LEVODOPA SCH TAB: 25; 100 TABLET ORAL at 09:20

## 2019-01-29 RX ADMIN — OXCARBAZEPINE SCH MG: 150 TABLET, FILM COATED ORAL at 09:19

## 2019-01-29 RX ADMIN — OXCARBAZEPINE SCH MG: 150 TABLET, FILM COATED ORAL at 20:37

## 2019-01-29 RX ADMIN — MELATONIN TAB 3 MG PRN MG: 3 TAB at 20:37

## 2019-01-29 RX ADMIN — DIAZEPAM PRN MG: 2 TABLET ORAL at 20:37

## 2019-01-30 VITALS — SYSTOLIC BLOOD PRESSURE: 110 MMHG | DIASTOLIC BLOOD PRESSURE: 53 MMHG

## 2019-01-30 VITALS — SYSTOLIC BLOOD PRESSURE: 119 MMHG | DIASTOLIC BLOOD PRESSURE: 67 MMHG

## 2019-01-30 RX ADMIN — MELATONIN TAB 3 MG PRN MG: 3 TAB at 20:38

## 2019-01-30 RX ADMIN — CARBIDOPA AND LEVODOPA SCH TAB: 25; 100 TABLET ORAL at 08:35

## 2019-01-30 RX ADMIN — DIAZEPAM PRN MG: 2 TABLET ORAL at 08:36

## 2019-01-30 RX ADMIN — DIAZEPAM PRN MG: 2 TABLET ORAL at 20:38

## 2019-01-30 RX ADMIN — OXCARBAZEPINE SCH MG: 150 TABLET, FILM COATED ORAL at 08:36

## 2019-01-30 RX ADMIN — OXCARBAZEPINE SCH MG: 150 TABLET, FILM COATED ORAL at 20:37

## 2019-01-31 VITALS — SYSTOLIC BLOOD PRESSURE: 116 MMHG | DIASTOLIC BLOOD PRESSURE: 72 MMHG

## 2019-01-31 VITALS — DIASTOLIC BLOOD PRESSURE: 65 MMHG | SYSTOLIC BLOOD PRESSURE: 110 MMHG

## 2019-01-31 RX ADMIN — DIAZEPAM PRN MG: 2 TABLET ORAL at 14:31

## 2019-01-31 RX ADMIN — OXCARBAZEPINE SCH MG: 150 TABLET, FILM COATED ORAL at 20:10

## 2019-01-31 RX ADMIN — CARBIDOPA AND LEVODOPA SCH TAB: 25; 100 TABLET ORAL at 08:27

## 2019-01-31 RX ADMIN — DIAZEPAM PRN MG: 2 TABLET ORAL at 03:58

## 2019-01-31 RX ADMIN — OXCARBAZEPINE SCH MG: 150 TABLET, FILM COATED ORAL at 08:27

## 2019-02-01 VITALS — DIASTOLIC BLOOD PRESSURE: 70 MMHG | SYSTOLIC BLOOD PRESSURE: 136 MMHG

## 2019-02-01 VITALS — SYSTOLIC BLOOD PRESSURE: 119 MMHG | DIASTOLIC BLOOD PRESSURE: 66 MMHG

## 2019-02-01 RX ADMIN — DIAZEPAM PRN MG: 2 TABLET ORAL at 19:57

## 2019-02-01 RX ADMIN — CIPROFLOXACIN HYDROCHLORIDE SCH DROP: 3 SOLUTION/ DROPS OPHTHALMIC at 18:03

## 2019-02-01 RX ADMIN — OXCARBAZEPINE SCH MG: 150 TABLET, FILM COATED ORAL at 19:56

## 2019-02-01 RX ADMIN — DIAZEPAM PRN MG: 2 TABLET ORAL at 17:07

## 2019-02-01 RX ADMIN — CARBIDOPA AND LEVODOPA SCH TAB: 25; 100 TABLET ORAL at 09:24

## 2019-02-01 RX ADMIN — OXCARBAZEPINE SCH MG: 150 TABLET, FILM COATED ORAL at 09:24

## 2019-02-01 RX ADMIN — MELATONIN TAB 3 MG PRN MG: 3 TAB at 19:56

## 2019-02-02 VITALS — SYSTOLIC BLOOD PRESSURE: 119 MMHG | DIASTOLIC BLOOD PRESSURE: 69 MMHG

## 2019-02-02 VITALS — SYSTOLIC BLOOD PRESSURE: 150 MMHG | DIASTOLIC BLOOD PRESSURE: 75 MMHG

## 2019-02-02 RX ADMIN — CARBIDOPA AND LEVODOPA SCH TAB: 25; 100 TABLET ORAL at 08:07

## 2019-02-02 RX ADMIN — CIPROFLOXACIN HYDROCHLORIDE SCH DROP: 3 SOLUTION/ DROPS OPHTHALMIC at 07:25

## 2019-02-02 RX ADMIN — ACETAMINOPHEN PRN MG: 325 TABLET, FILM COATED ORAL at 16:49

## 2019-02-02 RX ADMIN — MELATONIN TAB 3 MG PRN MG: 3 TAB at 20:19

## 2019-02-02 RX ADMIN — OXCARBAZEPINE SCH MG: 150 TABLET, FILM COATED ORAL at 20:19

## 2019-02-02 RX ADMIN — DIAZEPAM PRN MG: 2 TABLET ORAL at 08:08

## 2019-02-02 RX ADMIN — DIAZEPAM PRN MG: 2 TABLET ORAL at 16:49

## 2019-02-02 RX ADMIN — OXCARBAZEPINE SCH MG: 150 TABLET, FILM COATED ORAL at 08:08

## 2019-02-02 RX ADMIN — DIAZEPAM PRN MG: 2 TABLET ORAL at 20:19

## 2019-02-03 VITALS — DIASTOLIC BLOOD PRESSURE: 66 MMHG | SYSTOLIC BLOOD PRESSURE: 112 MMHG

## 2019-02-03 VITALS — SYSTOLIC BLOOD PRESSURE: 147 MMHG | DIASTOLIC BLOOD PRESSURE: 80 MMHG

## 2019-02-03 RX ADMIN — MELATONIN TAB 3 MG PRN MG: 3 TAB at 20:20

## 2019-02-03 RX ADMIN — DIAZEPAM PRN MG: 2 TABLET ORAL at 20:20

## 2019-02-03 RX ADMIN — OXCARBAZEPINE SCH MG: 150 TABLET, FILM COATED ORAL at 20:21

## 2019-02-03 RX ADMIN — CARBIDOPA AND LEVODOPA SCH TAB: 25; 100 TABLET ORAL at 08:40

## 2019-02-03 RX ADMIN — DIAZEPAM PRN MG: 2 TABLET ORAL at 12:57

## 2019-02-03 RX ADMIN — OXCARBAZEPINE SCH MG: 150 TABLET, FILM COATED ORAL at 08:40

## 2019-02-04 VITALS — SYSTOLIC BLOOD PRESSURE: 122 MMHG | DIASTOLIC BLOOD PRESSURE: 71 MMHG

## 2019-02-04 VITALS — DIASTOLIC BLOOD PRESSURE: 61 MMHG | SYSTOLIC BLOOD PRESSURE: 99 MMHG

## 2019-02-04 RX ADMIN — OXCARBAZEPINE SCH MG: 150 TABLET, FILM COATED ORAL at 20:05

## 2019-02-04 RX ADMIN — CARBIDOPA AND LEVODOPA SCH TAB: 25; 100 TABLET ORAL at 08:20

## 2019-02-04 RX ADMIN — DIAZEPAM PRN MG: 2 TABLET ORAL at 15:17

## 2019-02-04 RX ADMIN — MELATONIN TAB 3 MG PRN MG: 3 TAB at 20:05

## 2019-02-04 RX ADMIN — OXCARBAZEPINE SCH MG: 150 TABLET, FILM COATED ORAL at 08:20

## 2019-02-04 RX ADMIN — DIAZEPAM PRN MG: 5 TABLET ORAL at 22:10

## 2019-02-05 VITALS — SYSTOLIC BLOOD PRESSURE: 115 MMHG | DIASTOLIC BLOOD PRESSURE: 71 MMHG

## 2019-02-05 VITALS — SYSTOLIC BLOOD PRESSURE: 123 MMHG | DIASTOLIC BLOOD PRESSURE: 75 MMHG

## 2019-02-05 RX ADMIN — CARBIDOPA AND LEVODOPA SCH TAB: 25; 100 TABLET ORAL at 08:36

## 2019-02-05 RX ADMIN — DIAZEPAM PRN MG: 5 TABLET ORAL at 20:39

## 2019-02-05 RX ADMIN — DIAZEPAM PRN MG: 5 TABLET ORAL at 08:37

## 2019-02-05 RX ADMIN — OXCARBAZEPINE SCH MG: 150 TABLET, FILM COATED ORAL at 08:36

## 2019-02-05 RX ADMIN — CIPROFLOXACIN HYDROCHLORIDE SCH DROP: 3 SOLUTION/ DROPS OPHTHALMIC at 13:16

## 2019-02-05 RX ADMIN — OXCARBAZEPINE SCH MG: 150 TABLET, FILM COATED ORAL at 20:39

## 2019-02-06 VITALS — SYSTOLIC BLOOD PRESSURE: 105 MMHG | DIASTOLIC BLOOD PRESSURE: 56 MMHG

## 2019-02-06 VITALS — SYSTOLIC BLOOD PRESSURE: 96 MMHG | DIASTOLIC BLOOD PRESSURE: 62 MMHG

## 2019-02-06 RX ADMIN — MELATONIN TAB 3 MG PRN MG: 3 TAB at 21:48

## 2019-02-06 RX ADMIN — OXCARBAZEPINE SCH MG: 150 TABLET, FILM COATED ORAL at 20:13

## 2019-02-06 RX ADMIN — DIAZEPAM PRN MG: 5 TABLET ORAL at 20:13

## 2019-02-06 RX ADMIN — CARBIDOPA AND LEVODOPA SCH TAB: 25; 100 TABLET ORAL at 08:21

## 2019-02-06 RX ADMIN — OXCARBAZEPINE SCH MG: 150 TABLET, FILM COATED ORAL at 08:21

## 2019-02-06 RX ADMIN — DIAZEPAM PRN MG: 5 TABLET ORAL at 08:21

## 2019-02-07 VITALS — DIASTOLIC BLOOD PRESSURE: 70 MMHG | SYSTOLIC BLOOD PRESSURE: 107 MMHG

## 2019-02-07 VITALS — DIASTOLIC BLOOD PRESSURE: 88 MMHG | SYSTOLIC BLOOD PRESSURE: 143 MMHG

## 2019-02-07 RX ADMIN — DIAZEPAM PRN MG: 5 TABLET ORAL at 12:24

## 2019-02-07 RX ADMIN — DIAZEPAM PRN MG: 5 TABLET ORAL at 21:11

## 2019-02-07 RX ADMIN — OXCARBAZEPINE SCH MG: 150 TABLET, FILM COATED ORAL at 21:11

## 2019-02-07 RX ADMIN — OXCARBAZEPINE SCH MG: 150 TABLET, FILM COATED ORAL at 09:47

## 2019-02-07 RX ADMIN — CARBIDOPA AND LEVODOPA SCH TAB: 25; 100 TABLET ORAL at 09:47

## 2019-02-08 VITALS — DIASTOLIC BLOOD PRESSURE: 70 MMHG | SYSTOLIC BLOOD PRESSURE: 115 MMHG

## 2019-02-08 VITALS — SYSTOLIC BLOOD PRESSURE: 115 MMHG | DIASTOLIC BLOOD PRESSURE: 67 MMHG

## 2019-02-08 RX ADMIN — OXCARBAZEPINE SCH MG: 150 TABLET, FILM COATED ORAL at 08:30

## 2019-02-08 RX ADMIN — MELATONIN TAB 3 MG PRN MG: 3 TAB at 20:14

## 2019-02-08 RX ADMIN — DIAZEPAM PRN MG: 5 TABLET ORAL at 20:14

## 2019-02-08 RX ADMIN — MELATONIN TAB 3 MG PRN MG: 3 TAB at 01:20

## 2019-02-08 RX ADMIN — CARBIDOPA AND LEVODOPA SCH TAB: 25; 100 TABLET ORAL at 08:30

## 2019-02-08 RX ADMIN — OXCARBAZEPINE SCH MG: 150 TABLET, FILM COATED ORAL at 20:14

## 2019-02-09 VITALS — DIASTOLIC BLOOD PRESSURE: 79 MMHG | SYSTOLIC BLOOD PRESSURE: 117 MMHG

## 2019-02-09 RX ADMIN — OXCARBAZEPINE SCH MG: 150 TABLET, FILM COATED ORAL at 20:35

## 2019-02-09 RX ADMIN — DIAZEPAM PRN MG: 5 TABLET ORAL at 19:49

## 2019-02-09 RX ADMIN — MELATONIN TAB 3 MG PRN MG: 3 TAB at 19:49

## 2019-02-09 RX ADMIN — CARBIDOPA AND LEVODOPA SCH TAB: 25; 100 TABLET ORAL at 08:47

## 2019-02-09 RX ADMIN — OXCARBAZEPINE SCH MG: 150 TABLET, FILM COATED ORAL at 19:49

## 2019-02-09 RX ADMIN — OXCARBAZEPINE SCH MG: 150 TABLET, FILM COATED ORAL at 08:47

## 2019-02-10 VITALS — SYSTOLIC BLOOD PRESSURE: 128 MMHG | DIASTOLIC BLOOD PRESSURE: 74 MMHG

## 2019-02-10 VITALS — SYSTOLIC BLOOD PRESSURE: 111 MMHG | DIASTOLIC BLOOD PRESSURE: 65 MMHG

## 2019-02-10 VITALS — SYSTOLIC BLOOD PRESSURE: 115 MMHG | DIASTOLIC BLOOD PRESSURE: 77 MMHG

## 2019-02-10 VITALS — DIASTOLIC BLOOD PRESSURE: 77 MMHG | SYSTOLIC BLOOD PRESSURE: 115 MMHG

## 2019-02-10 VITALS — SYSTOLIC BLOOD PRESSURE: 113 MMHG | DIASTOLIC BLOOD PRESSURE: 65 MMHG

## 2019-02-10 VITALS — SYSTOLIC BLOOD PRESSURE: 101 MMHG | DIASTOLIC BLOOD PRESSURE: 63 MMHG

## 2019-02-10 VITALS — DIASTOLIC BLOOD PRESSURE: 65 MMHG | SYSTOLIC BLOOD PRESSURE: 113 MMHG

## 2019-02-10 VITALS — DIASTOLIC BLOOD PRESSURE: 74 MMHG | SYSTOLIC BLOOD PRESSURE: 125 MMHG

## 2019-02-10 VITALS — SYSTOLIC BLOOD PRESSURE: 115 MMHG | DIASTOLIC BLOOD PRESSURE: 61 MMHG

## 2019-02-10 VITALS — DIASTOLIC BLOOD PRESSURE: 90 MMHG | SYSTOLIC BLOOD PRESSURE: 122 MMHG

## 2019-02-10 VITALS — DIASTOLIC BLOOD PRESSURE: 63 MMHG | SYSTOLIC BLOOD PRESSURE: 105 MMHG

## 2019-02-10 VITALS — DIASTOLIC BLOOD PRESSURE: 74 MMHG | SYSTOLIC BLOOD PRESSURE: 118 MMHG

## 2019-02-10 RX ADMIN — DIAZEPAM PRN MG: 5 TABLET ORAL at 09:13

## 2019-02-10 RX ADMIN — OXCARBAZEPINE SCH MG: 150 TABLET, FILM COATED ORAL at 09:13

## 2019-02-10 RX ADMIN — OXCARBAZEPINE SCH MG: 150 TABLET, FILM COATED ORAL at 20:27

## 2019-02-10 RX ADMIN — DIAZEPAM SCH MG: 2 TABLET ORAL at 20:27

## 2019-02-10 RX ADMIN — CARBIDOPA AND LEVODOPA SCH TAB: 25; 100 TABLET ORAL at 09:13

## 2019-02-10 RX ADMIN — MELATONIN TAB 3 MG PRN MG: 3 TAB at 20:26

## 2019-02-11 VITALS — DIASTOLIC BLOOD PRESSURE: 61 MMHG | SYSTOLIC BLOOD PRESSURE: 135 MMHG

## 2019-02-11 VITALS — DIASTOLIC BLOOD PRESSURE: 67 MMHG | SYSTOLIC BLOOD PRESSURE: 124 MMHG

## 2019-02-11 RX ADMIN — DIAZEPAM SCH MG: 2 TABLET ORAL at 20:01

## 2019-02-11 RX ADMIN — CARBIDOPA AND LEVODOPA SCH TAB: 25; 100 TABLET ORAL at 09:08

## 2019-02-11 RX ADMIN — MELATONIN TAB 3 MG PRN MG: 3 TAB at 20:01

## 2019-02-11 RX ADMIN — DIAZEPAM SCH MG: 2 TABLET ORAL at 09:08

## 2019-02-11 RX ADMIN — OXCARBAZEPINE SCH MG: 150 TABLET, FILM COATED ORAL at 09:08

## 2019-02-11 RX ADMIN — OXCARBAZEPINE SCH MG: 150 TABLET, FILM COATED ORAL at 20:01

## 2019-02-11 RX ADMIN — DIAZEPAM SCH MG: 2 TABLET ORAL at 16:32

## 2019-02-12 VITALS — DIASTOLIC BLOOD PRESSURE: 68 MMHG | SYSTOLIC BLOOD PRESSURE: 116 MMHG

## 2019-02-12 VITALS — DIASTOLIC BLOOD PRESSURE: 65 MMHG | SYSTOLIC BLOOD PRESSURE: 112 MMHG

## 2019-02-12 RX ADMIN — DIAZEPAM SCH MG: 2 TABLET ORAL at 17:26

## 2019-02-12 RX ADMIN — OXCARBAZEPINE SCH MG: 150 TABLET, FILM COATED ORAL at 08:57

## 2019-02-12 RX ADMIN — OXCARBAZEPINE SCH MG: 150 TABLET, FILM COATED ORAL at 20:58

## 2019-02-12 RX ADMIN — DIAZEPAM SCH MG: 2 TABLET ORAL at 08:57

## 2019-02-12 RX ADMIN — DIAZEPAM SCH MG: 2 TABLET ORAL at 20:58

## 2019-02-12 RX ADMIN — CARBIDOPA AND LEVODOPA SCH TAB: 25; 100 TABLET ORAL at 08:57

## 2019-02-13 VITALS — DIASTOLIC BLOOD PRESSURE: 72 MMHG | SYSTOLIC BLOOD PRESSURE: 106 MMHG

## 2019-02-13 RX ADMIN — OXCARBAZEPINE SCH MG: 150 TABLET, FILM COATED ORAL at 08:20

## 2019-02-13 RX ADMIN — MELATONIN TAB 3 MG PRN MG: 3 TAB at 02:12

## 2019-02-13 RX ADMIN — CARBIDOPA AND LEVODOPA SCH TAB: 25; 100 TABLET ORAL at 08:20

## 2019-02-13 RX ADMIN — CIPROFLOXACIN HYDROCHLORIDE SCH DROP: 3 SOLUTION/ DROPS OPHTHALMIC at 06:36

## 2019-02-13 RX ADMIN — DIAZEPAM SCH MG: 2 TABLET ORAL at 08:20

## 2019-02-26 ENCOUNTER — TELEPHONE (OUTPATIENT)
Dept: CARDIOLOGY | Facility: MEDICAL CENTER | Age: 84
End: 2019-02-26

## 2019-02-26 NOTE — TELEPHONE ENCOUNTER
Spoke with wife--she is requesting a letter for family and  regarding trust/will--she wanted to know if Dr. Mckeon is willing/able to do.  Patient in care facility.

## 2019-02-26 NOTE — TELEPHONE ENCOUNTER
Called pt's wife (Reyna), per Reyna, they are amending their trust/will, they were advice by their  that they need letters from two different physician that pt is unable to make decisions for himself and is unable to sign, they have contacted pt's Neurologist and was providing the letter but they are wondering if Dr Mckeon could be the second MD to write the letter. Informed wife that will discussed w/ Dr Mckeon and will let her know. Pt reports Atty is Iker Mai P#[phone] ext [phone].     To Dr Mckeon ok to write letter? Thank You!

## 2019-02-26 NOTE — LETTER
February 27, 2019        Re:    Shukri Morrow  [address] [place] [address]        To whom it may concern:    This is to certify that Mr Shukri Morrow is a patient of mine that I follow and see in our clinic. He is suffering from dementia with associated memory loss.     If you have any questions or concerns, please don't hesitate to call our office, [phone].         Sincerely,          _______________________  Dionne Mckeon M.D.  Cardiologist  [facility]

## 2019-02-27 NOTE — TELEPHONE ENCOUNTER
Letter drafted and signed by Dr Mckeon.     Called pt's wife (Reyna) and notified, wife appreciative and verbalizes understanding    Letter left front podium for wife to

## 2019-03-21 ENCOUNTER — TELEPHONE (OUTPATIENT)
Dept: CARDIOLOGY | Facility: MEDICAL CENTER | Age: 84
End: 2019-03-21

## 2019-03-21 NOTE — TELEPHONE ENCOUNTER
----- Message from Princess Godoy sent at 3/12/2019 11:24 AM PDT -----  Regarding: can someone make a notation, pt has   Pt's wife Reyna, lm that pt passed away on 3-4-2019.  Thank you,  Princess
